# Patient Record
Sex: MALE | Race: WHITE | Employment: OTHER | URBAN - METROPOLITAN AREA
[De-identification: names, ages, dates, MRNs, and addresses within clinical notes are randomized per-mention and may not be internally consistent; named-entity substitution may affect disease eponyms.]

---

## 2017-04-16 ENCOUNTER — APPOINTMENT (OUTPATIENT)
Dept: CT IMAGING | Age: 77
End: 2017-04-16
Attending: EMERGENCY MEDICINE
Payer: MEDICARE

## 2017-04-16 ENCOUNTER — HOSPITAL ENCOUNTER (OUTPATIENT)
Age: 77
Setting detail: OBSERVATION
Discharge: HOME | End: 2017-04-17
Attending: EMERGENCY MEDICINE | Admitting: FAMILY MEDICINE
Payer: MEDICARE

## 2017-04-16 DIAGNOSIS — G45.9 TRANSIENT CEREBRAL ISCHEMIA, UNSPECIFIED TYPE: Primary | ICD-10-CM

## 2017-04-16 PROBLEM — E87.5 HYPERKALEMIA: Status: ACTIVE | Noted: 2017-04-16

## 2017-04-16 PROBLEM — R20.2 NUMBNESS AND TINGLING: Status: ACTIVE | Noted: 2017-04-16

## 2017-04-16 PROBLEM — R20.0 NUMBNESS AND TINGLING: Status: ACTIVE | Noted: 2017-04-16

## 2017-04-16 LAB
ALBUMIN SERPL BCP-MCNC: 3.9 G/DL (ref 3.5–5)
ALBUMIN/GLOB SERPL: 1.1 {RATIO} (ref 1.1–2.2)
ALP SERPL-CCNC: 60 U/L (ref 45–117)
ALT SERPL-CCNC: 27 U/L (ref 12–78)
ANION GAP BLD CALC-SCNC: 6 MMOL/L (ref 5–15)
AST SERPL W P-5'-P-CCNC: 35 U/L (ref 15–37)
BASOPHILS # BLD AUTO: 0 K/UL (ref 0–0.1)
BASOPHILS # BLD: 0 % (ref 0–1)
BILIRUB SERPL-MCNC: 0.4 MG/DL (ref 0.2–1)
BUN SERPL-MCNC: 17 MG/DL (ref 6–20)
BUN/CREAT SERPL: 21 (ref 12–20)
CALCIUM SERPL-MCNC: 8.9 MG/DL (ref 8.5–10.1)
CHLORIDE SERPL-SCNC: 103 MMOL/L (ref 97–108)
CO2 SERPL-SCNC: 26 MMOL/L (ref 21–32)
CREAT SERPL-MCNC: 0.8 MG/DL (ref 0.7–1.3)
EOSINOPHIL # BLD: 0 K/UL (ref 0–0.4)
EOSINOPHIL NFR BLD: 1 % (ref 0–7)
ERYTHROCYTE [DISTWIDTH] IN BLOOD BY AUTOMATED COUNT: 13.6 % (ref 11.5–14.5)
GLOBULIN SER CALC-MCNC: 3.6 G/DL (ref 2–4)
GLUCOSE BLD STRIP.AUTO-MCNC: 107 MG/DL (ref 65–100)
GLUCOSE SERPL-MCNC: 108 MG/DL (ref 65–100)
HCT VFR BLD AUTO: 39.5 % (ref 36.6–50.3)
HGB BLD-MCNC: 14 G/DL (ref 12.1–17)
INR BLD: 1 (ref 0.9–1.2)
LYMPHOCYTES # BLD AUTO: 38 % (ref 12–49)
LYMPHOCYTES # BLD: 1.8 K/UL (ref 0.8–3.5)
MCH RBC QN AUTO: 32.3 PG (ref 26–34)
MCHC RBC AUTO-ENTMCNC: 35.4 G/DL (ref 30–36.5)
MCV RBC AUTO: 91.2 FL (ref 80–99)
MONOCYTES # BLD: 0.4 K/UL (ref 0–1)
MONOCYTES NFR BLD AUTO: 9 % (ref 5–13)
NEUTS SEG # BLD: 2.5 K/UL (ref 1.8–8)
NEUTS SEG NFR BLD AUTO: 52 % (ref 32–75)
PLATELET # BLD AUTO: 86 K/UL (ref 150–400)
POTASSIUM SERPL-SCNC: 5.3 MMOL/L (ref 3.5–5.1)
PROT SERPL-MCNC: 7.5 G/DL (ref 6.4–8.2)
RBC # BLD AUTO: 4.33 M/UL (ref 4.1–5.7)
SERVICE CMNT-IMP: ABNORMAL
SODIUM SERPL-SCNC: 135 MMOL/L (ref 136–145)
TROPONIN I SERPL-MCNC: <0.04 NG/ML
WBC # BLD AUTO: 4.7 K/UL (ref 4.1–11.1)

## 2017-04-16 PROCEDURE — 85025 COMPLETE CBC W/AUTO DIFF WBC: CPT | Performed by: EMERGENCY MEDICINE

## 2017-04-16 PROCEDURE — 70450 CT HEAD/BRAIN W/O DYE: CPT

## 2017-04-16 PROCEDURE — 85610 PROTHROMBIN TIME: CPT

## 2017-04-16 PROCEDURE — 84484 ASSAY OF TROPONIN QUANT: CPT | Performed by: EMERGENCY MEDICINE

## 2017-04-16 PROCEDURE — 65660000000 HC RM CCU STEPDOWN

## 2017-04-16 PROCEDURE — 93005 ELECTROCARDIOGRAM TRACING: CPT

## 2017-04-16 PROCEDURE — 80053 COMPREHEN METABOLIC PANEL: CPT | Performed by: EMERGENCY MEDICINE

## 2017-04-16 PROCEDURE — 82962 GLUCOSE BLOOD TEST: CPT

## 2017-04-16 PROCEDURE — 99285 EMERGENCY DEPT VISIT HI MDM: CPT

## 2017-04-16 PROCEDURE — 36415 COLL VENOUS BLD VENIPUNCTURE: CPT | Performed by: EMERGENCY MEDICINE

## 2017-04-16 PROCEDURE — 65390000012 HC CONDITION CODE 44 OBSERVATION

## 2017-04-16 RX ORDER — SODIUM CHLORIDE 0.9 % (FLUSH) 0.9 %
5-10 SYRINGE (ML) INJECTION AS NEEDED
Status: DISCONTINUED | OUTPATIENT
Start: 2017-04-16 | End: 2017-04-17 | Stop reason: HOSPADM

## 2017-04-16 RX ORDER — OMEPRAZOLE 20 MG/1
20 CAPSULE, DELAYED RELEASE ORAL EVERY OTHER DAY
COMMUNITY

## 2017-04-16 RX ORDER — ATORVASTATIN CALCIUM 20 MG/1
20 TABLET, FILM COATED ORAL DAILY
COMMUNITY

## 2017-04-16 RX ORDER — SODIUM CHLORIDE 0.9 % (FLUSH) 0.9 %
5-10 SYRINGE (ML) INJECTION EVERY 8 HOURS
Status: DISCONTINUED | OUTPATIENT
Start: 2017-04-16 | End: 2017-04-17 | Stop reason: HOSPADM

## 2017-04-16 NOTE — Clinical Note
Status[de-identified] Inpatient [101] Type of Bed: Neuro/Stroke [9] Inpatient Hospitalization Certified Necessary for the Following Reasons: 4. Patient requires ICU level of care interventions (further clarification in H&P documentation) Admitting Diagnosis: TIA (transient ischemic attack) [246236] Admitting Diagnosis: Numbness and tingling [058585] Admitting Diagnosis: Hyperkalemia [079267] Admitting Physician: Mehdi Mazariegos Attending Physician: Mehdi Mazariegos Estimated Length of Stay: > or = to 2 Midnights Discharge Plan[de-identified] Home with Office Follow-up

## 2017-04-16 NOTE — ED PROVIDER NOTES
HPI Comments: 70-year-old male with diabetes presents with complaints of numbness and tingling in the right first and second digits and right-sided face at approximately 4:30 PM lasting one minute. All symptoms resolved. No other complaints. Denies any recent illness. Denies fever, chills, nausea, vomiting, headache, chest pain, shortness of breath, abdominal pain. Patient took 3 baby aspirin prior to arrival.    Denies tobacco use, drug use, alcohol use    The history is provided by the patient. Past Medical History:   Diagnosis Date    Diabetes ([address]     Murmur        Past Surgical History:   Procedure Laterality Date    HX APPENDECTOMY      HX HERNIA REPAIR      HX ORTHOPAEDIC      right shoulder    HX SEPTOPLASTY      HX TONSILLECTOMY           History reviewed. No pertinent family history. Social History     Social History    Marital status: N/A     Spouse name: N/A    Number of children: N/A    Years of education: N/A     Occupational History    Not on file. Social History Main Topics    Smoking status: Never Smoker    Smokeless tobacco: Not on file    Alcohol use No    Drug use: Not on file    Sexual activity: Not on file     Other Topics Concern    Not on file     Social History Narrative    No narrative on file         ALLERGIES: Review of patient's allergies indicates no known allergies. Review of Systems   Constitutional: Negative for chills and fever. HENT: Negative for congestion, nosebleeds and sore throat. Eyes: Negative for pain and discharge. Respiratory: Negative for cough and shortness of breath. Cardiovascular: Negative for chest pain and palpitations. Gastrointestinal: Negative for abdominal pain, constipation, nausea and vomiting. Genitourinary: Negative for decreased urine volume, dysuria, flank pain and urgency. Musculoskeletal: Negative for gait problem and myalgias. Skin: Negative for rash and wound.    Neurological: Positive for facial asymmetry and numbness. Negative for seizures and syncope. Hematological: Does not bruise/bleed easily. Psychiatric/Behavioral: Negative for confusion, self-injury and suicidal ideas. Vitals:    04/16/17 1839   BP: 124/86   Pulse: 84   Resp: 16   Temp: 97.9 °F (36.6 °C)   SpO2: 96%   Weight: 77.7 kg (171 lb 6.4 oz)   Height: 5' 8\" (1.727 m)            Physical Exam   Constitutional: He is oriented to person, place, and time. He appears well-developed and well-nourished. HENT:   Head: Normocephalic and atraumatic. Eyes: EOM are normal. Pupils are equal, round, and reactive to light. Neck: Normal range of motion. Neck supple. Cardiovascular: Normal rate, regular rhythm, normal heart sounds and intact distal pulses. Pulmonary/Chest: Effort normal and breath sounds normal. No respiratory distress. He has no wheezes. Abdominal: Soft. Bowel sounds are normal. There is no tenderness. There is no rebound and no guarding. Musculoskeletal: Normal range of motion. Neurological: He is alert and oriented to person, place, and time. NIHSS 1 for R lower facial droop   Skin: Skin is warm and dry. Psychiatric: He has a normal mood and affect. His behavior is normal.   Nursing note and vitals reviewed. MDM  Number of Diagnoses or Management Options  Diagnosis management comments:     72-year-old male presents with complaints of right-sided facial and hand numbness/tingling lasting approximately 1 minute. No current complaints. Patient is well-appearing, in no acute distress, hemodynamically stable, mild right-sided facial droop noted, and a stroke score 1. Plan-code stroke evaluation.        Amount and/or Complexity of Data Reviewed  Clinical lab tests: ordered and reviewed  Tests in the radiology section of CPT®: reviewed and ordered  Independent visualization of images, tracings, or specimens: yes    Risk of Complications, Morbidity, and/or Mortality  Presenting problems: high  Diagnostic procedures: high  Management options: high    Patient Progress  Patient progress: stable    ED Course       Procedures    ED EKG interpretation:  Rhythm: normal sinus rhythm; and regular . Rate (approx.): 80; Axis: normal; P wave: normal; QRS interval: normal ; ST/T wave: normal; no ischemia This EKG was interpreted by Kimberley Salinas MD,ED Provider. 7:18 PM  Kimberley Salinas MD spoke with Dr. Sanjeev Coates, Consult for Neurology. Discussed available diagnostic tests and clinical findings. He/She is in agreement with care plans as outlined. He/she recommends TIA admission. 7:50 PM  Kimberley Salinas MD spoke with Dr. Zelda Alvarez, Consult for Hospitalist. Discussed available diagnostic tests and clinical findings. He/She is in agreement with care plans as outlined. He/she will admit patient.

## 2017-04-16 NOTE — PROGRESS NOTES
Admission Medication Reconciliation:    Information obtained from: Patient, Rx Query    Significant PMH/Disease States:   Past Medical History:   Diagnosis Date    Diabetes [facility])     Murmur        Chief Complaint for this Admission:    Chief Complaint   Patient presents with    Numbness         Allergies:  Review of patient's allergies indicates no known allergies. Prior to Admission Medications:   Prior to Admission Medications   Prescriptions Last Dose Informant Patient Reported? Taking? L. acidoph & paracasei- S therm- Bifido (BERTRAND-Q/RISAQUAD) 8 billion cell cap cap 4/16/2017  Yes Yes   Sig: Take 1 Cap by mouth daily. VIT A/VIT C/VIT E/ZINC/COPPER (PRESERVISION AREDS PO) 4/16/2017  Yes Yes   Sig: Take 1 Tab by mouth two (2) times a day. atorvastatin (LIPITOR) 20 mg tablet 4/16/2017 at Unknown time  Yes Yes   Sig: Take 20 mg by mouth daily. omeprazole (PRILOSEC) 20 mg capsule 4/15/2017  Yes Yes   Sig: Take 20 mg by mouth every other day. Facility-Administered Medications: None         Comments/Recommendations: Spoke with patient. All medication entries above added today.         Alix Maldonado, PharmD

## 2017-04-16 NOTE — PROGRESS NOTES
Responded to Code Stroke in ED26. No family is present at this time. Unable to assess patient due to medical care being provided. Spiritual Care Services will follow-up as needed and able. Chaplain Kameron Wray M.Div.    Paging Service [phone])

## 2017-04-16 NOTE — IP AVS SNAPSHOT
[address] [facility] 
[phone] Patient: Michael Matute MRN: [id number] :1940 You are allergic to the following No active allergies Recent Documentation Height Weight BMI Smoking Status 1.727 m 77.7 kg 26.06 kg/m2 Never Smoker Emergency Contacts Name Discharge Info Relation Home Work Mobile Wendi Coleman  Spouse [3] [phone] About your hospitalization You were admitted on:  2017 You last received care in the:  [address] [place] [address] OBSERVATION You were discharged on:  2017 Unit phone number:  [phone] Why you were hospitalized Your primary diagnosis was:  Tia (Transient Ischemic Attack) Your diagnoses also included:  Hyperkalemia, Numbness And Tingling Providers Seen During Your Hospitalizations Provider Role Specialty Primary office phone Andrei Dunham MD Attending Provider Emergency Medicine [phone] Mega Sharif MD Attending Provider [facility] [phone] Yessi Retana MD Attending Provider Internal Medicine [phone] Your Primary Care Physician (PCP) Primary Care Physician Office Phone Office Fax Etelvina Bennett [phone] Follow-up Information Follow up With Details Comments Contact Info Ciara Krishna MD In 1 week  [facility] [address] [facility] [address] 
[phone] Citlalli Bautista MD In 2 weeks  Berenice Venegas [facility] [address] 
[phone] Current Discharge Medication List  
  
START taking these medications Dose & Instructions Dispensing Information Comments Morning Noon Evening Bedtime  
 aspirin delayed-release 81 mg tablet Your last dose was: Your next dose is:    
   
   
 Dose:  81 mg Take 1 Tab by mouth daily for 30 days. Quantity:  30 Tab Refills:  0 CONTINUE these medications which have NOT CHANGED Dose & Instructions Dispensing Information Comments Morning Noon Evening Bedtime  
 atorvastatin 20 mg tablet Commonly known as:  LIPITOR Your last dose was: Your next dose is:    
   
   
 Dose:  20 mg Take 20 mg by mouth daily. Refills:  0  
     
   
   
   
  
 L. acidoph & paracasei- S therm- Bifido 8 billion cell Cap cap Commonly known as:  BERTRAND-Q/RISAQUAD Your last dose was: Your next dose is:    
   
   
 Dose:  1 Cap Take 1 Cap by mouth daily. Refills:  0  
     
   
   
   
  
 omeprazole 20 mg capsule Commonly known as:  PRILOSEC Your last dose was: Your next dose is:    
   
   
 Dose:  20 mg Take 20 mg by mouth every other day. Refills:  0 PRESERVISION AREDS PO Your last dose was: Your next dose is:    
   
   
 Dose:  1 Tab Take 1 Tab by mouth two (2) times a day. Refills:  0 Where to Get Your Medications Information on where to get these meds will be given to you by the nurse or doctor. ! Ask your nurse or doctor about these medications  
  aspirin delayed-release 81 mg tablet Discharge Instructions Discharge Instructions PATIENT ID: Wendy Funez MRN: [id number] YOB: 1940 DATE OF ADMISSION: 4/16/2017  6:44 PM   
DATE OF DISCHARGE: 4/17/2017 PRIMARY CARE PROVIDER: Mark Clarke MD  
 
ATTENDING PHYSICIAN: Vandana Ramirez MD 
DISCHARGING PROVIDER: Vandana Ramirez MD   
To contact this individual call [phone] and ask the  to page. If unavailable ask to be transferred the Adult Hospitalist Department. DISCHARGE DIAGNOSES TIA 
 
CONSULTATIONS: IP CONSULT TO HOSPITALIST 
IP CONSULT TO NEUROLOGY PROCEDURES/SURGERIES: * No surgery found * PENDING TEST RESULTS:  
 At the time of discharge the following test results are still pending: FOLLOW UP APPOINTMENTS:  
Follow-up Information Follow up With Details Comments Contact Info Stephen Marquez MD In 1 week  [address] [place] [address] 
[phone] ADDITIONAL CARE RECOMMENDATIONS:  
 
DIET: Cardiac Diet ACTIVITY: Activity as tolerated WOUND CARE:  
 
EQUIPMENT needed:  
 
 
  
 SNF/Inpatient Rehab/LTAC Independent/assisted living Hospice Other: CDMP Checked:  
Yes x PROBLEM LIST Updated: 
Yes x Signed:  
Vernon Loyola MD 
4/17/2017 11:59 AM 
 
Discharge Orders None [facility] Announcement We are excited to announce that we are making your provider's discharge notes available to you in [employer]. You will see these notes when they are completed and signed by the physician that discharged you from your recent hospital stay. If you have any questions or concerns about any information you see in [employer], please call the Health Information Department where you were seen or reach out to your Primary Care Provider for more information about your plan of care. Introducing [facility]! Radha Villa introduces [employer] patient portal. Now you can access parts of your medical record, email your doctor's office, and request medication refills online. 1. In your internet browser, go to [email or web address] 2. Click on the First Time User? Click Here link in the Sign In box. You will see the New Member Sign Up page. 3. Enter your [employer] Access Code exactly as it appears below. You will not need to use this code after youve completed the sign-up process. If you do not sign up before the expiration date, you must request a new code. · [employer] Access Code: [email or web address] Expires: 7/15/2017  7:03 PM 
 
4. Enter the last four digits of your Social Security Number (xxxx) and Date of Birth (mm/dd/yyyy) as indicated and click Submit. You will be taken to the next sign-up page. 5. Create a [employer] ID. This will be your [employer] login ID and cannot be changed, so think of one that is secure and easy to remember. 6. Create a [employer] password. You can change your password at any time. 7. Enter your Password Reset Question and Answer. This can be used at a later time if you forget your password. 8. Enter your e-mail address. You will receive e-mail notification when new information is available in [address] 9. Click Sign Up. You can now view and download portions of your medical record. 10. Click the Download Summary menu link to download a portable copy of your medical information. If you have questions, please visit the Frequently Asked Questions section of the [employer] website. Remember, [employer] is NOT to be used for urgent needs. For medical emergencies, dial 911. Now available from your iPhone and Android! General Information Please provide this summary of care documentation to your next provider. Patient Signature:  ____________________________________________________________ Date:  ____________________________________________________________  
  
Lashell Cardona Provider Signature:  ____________________________________________________________ Date:  ____________________________________________________________

## 2017-04-16 NOTE — ED TRIAGE NOTES
TRIAGE NOTE:\"I was outside this afternoon(1630) and noticed and odd tingling on right hand in 1st and 2nd fingers and at the same time tingling in lips (right side). It lasted less than 1 minute. \" Denies any other symptoms.

## 2017-04-17 ENCOUNTER — APPOINTMENT (OUTPATIENT)
Dept: MRI IMAGING | Age: 77
End: 2017-04-17
Attending: FAMILY MEDICINE
Payer: MEDICARE

## 2017-04-17 VITALS
BODY MASS INDEX: 25.98 KG/M2 | HEIGHT: 68 IN | DIASTOLIC BLOOD PRESSURE: 81 MMHG | OXYGEN SATURATION: 96 % | HEART RATE: 66 BPM | WEIGHT: 171.4 LBS | SYSTOLIC BLOOD PRESSURE: 143 MMHG | TEMPERATURE: 98.4 F | RESPIRATION RATE: 16 BRPM

## 2017-04-17 PROBLEM — G45.9 TIA (TRANSIENT ISCHEMIC ATTACK): Status: RESOLVED | Noted: 2017-04-16 | Resolved: 2017-04-17

## 2017-04-17 PROBLEM — E87.5 HYPERKALEMIA: Status: RESOLVED | Noted: 2017-04-16 | Resolved: 2017-04-17

## 2017-04-17 PROBLEM — R20.0 NUMBNESS AND TINGLING: Status: RESOLVED | Noted: 2017-04-16 | Resolved: 2017-04-17

## 2017-04-17 PROBLEM — R20.2 NUMBNESS AND TINGLING: Status: RESOLVED | Noted: 2017-04-16 | Resolved: 2017-04-17

## 2017-04-17 LAB
ANION GAP BLD CALC-SCNC: 9 MMOL/L (ref 5–15)
ATRIAL RATE: 80 BPM
BUN SERPL-MCNC: 12 MG/DL (ref 6–20)
BUN/CREAT SERPL: 18 (ref 12–20)
CALCIUM SERPL-MCNC: 8.9 MG/DL (ref 8.5–10.1)
CALCULATED P AXIS, ECG09: 48 DEGREES
CALCULATED R AXIS, ECG10: -3 DEGREES
CALCULATED T AXIS, ECG11: 34 DEGREES
CHLORIDE SERPL-SCNC: 103 MMOL/L (ref 97–108)
CHOLEST SERPL-MCNC: 128 MG/DL
CO2 SERPL-SCNC: 24 MMOL/L (ref 21–32)
CREAT SERPL-MCNC: 0.68 MG/DL (ref 0.7–1.3)
DIAGNOSIS, 93000: NORMAL
EST. AVERAGE GLUCOSE BLD GHB EST-MCNC: 148 MG/DL
GLUCOSE BLD STRIP.AUTO-MCNC: 97 MG/DL (ref 65–100)
GLUCOSE SERPL-MCNC: 104 MG/DL (ref 65–100)
HBA1C MFR BLD: 6.8 % (ref 4.2–6.3)
HDLC SERPL-MCNC: 57 MG/DL
HDLC SERPL: 2.2 {RATIO} (ref 0–5)
LDLC SERPL CALC-MCNC: 56.2 MG/DL (ref 0–100)
LIPID PROFILE,FLP: NORMAL
P-R INTERVAL, ECG05: 208 MS
POTASSIUM SERPL-SCNC: 3.9 MMOL/L (ref 3.5–5.1)
Q-T INTERVAL, ECG07: 384 MS
QRS DURATION, ECG06: 96 MS
QTC CALCULATION (BEZET), ECG08: 442 MS
SERVICE CMNT-IMP: NORMAL
SODIUM SERPL-SCNC: 136 MMOL/L (ref 136–145)
TRIGL SERPL-MCNC: 74 MG/DL (ref ?–150)
VENTRICULAR RATE, ECG03: 80 BPM
VLDLC SERPL CALC-MCNC: 14.8 MG/DL

## 2017-04-17 PROCEDURE — 99218 HC RM OBSERVATION: CPT

## 2017-04-17 PROCEDURE — 97165 OT EVAL LOW COMPLEX 30 MIN: CPT | Performed by: OCCUPATIONAL THERAPIST

## 2017-04-17 PROCEDURE — G8988 SELF CARE GOAL STATUS: HCPCS | Performed by: OCCUPATIONAL THERAPIST

## 2017-04-17 PROCEDURE — 97112 NEUROMUSCULAR REEDUCATION: CPT | Performed by: OCCUPATIONAL THERAPIST

## 2017-04-17 PROCEDURE — 82962 GLUCOSE BLOOD TEST: CPT

## 2017-04-17 PROCEDURE — G8987 SELF CARE CURRENT STATUS: HCPCS | Performed by: OCCUPATIONAL THERAPIST

## 2017-04-17 PROCEDURE — 93880 EXTRACRANIAL BILAT STUDY: CPT

## 2017-04-17 PROCEDURE — 96361 HYDRATE IV INFUSION ADD-ON: CPT

## 2017-04-17 PROCEDURE — 80048 BASIC METABOLIC PNL TOTAL CA: CPT | Performed by: FAMILY MEDICINE

## 2017-04-17 PROCEDURE — 70551 MRI BRAIN STEM W/O DYE: CPT

## 2017-04-17 PROCEDURE — 74011250636 HC RX REV CODE- 250/636: Performed by: FAMILY MEDICINE

## 2017-04-17 PROCEDURE — 83036 HEMOGLOBIN GLYCOSYLATED A1C: CPT | Performed by: FAMILY MEDICINE

## 2017-04-17 PROCEDURE — 96360 HYDRATION IV INFUSION INIT: CPT

## 2017-04-17 PROCEDURE — G8989 SELF CARE D/C STATUS: HCPCS | Performed by: OCCUPATIONAL THERAPIST

## 2017-04-17 PROCEDURE — 36415 COLL VENOUS BLD VENIPUNCTURE: CPT | Performed by: FAMILY MEDICINE

## 2017-04-17 PROCEDURE — 93306 TTE W/DOPPLER COMPLETE: CPT

## 2017-04-17 PROCEDURE — 80061 LIPID PANEL: CPT | Performed by: FAMILY MEDICINE

## 2017-04-17 RX ORDER — SODIUM CHLORIDE 9 MG/ML
75 INJECTION, SOLUTION INTRAVENOUS CONTINUOUS
Status: DISCONTINUED | OUTPATIENT
Start: 2017-04-17 | End: 2017-04-17 | Stop reason: HOSPADM

## 2017-04-17 RX ORDER — MAGNESIUM SULFATE 100 %
4 CRYSTALS MISCELLANEOUS AS NEEDED
Status: DISCONTINUED | OUTPATIENT
Start: 2017-04-17 | End: 2017-04-17 | Stop reason: HOSPADM

## 2017-04-17 RX ORDER — ASPIRIN 81 MG/1
81 TABLET ORAL DAILY
Qty: 30 TAB | Refills: 0 | Status: SHIPPED | OUTPATIENT
Start: 2017-04-17 | End: 2017-05-17

## 2017-04-17 RX ORDER — ASPIRIN 81 MG/1
81 TABLET ORAL DAILY
Status: DISCONTINUED | OUTPATIENT
Start: 2017-04-17 | End: 2017-04-17 | Stop reason: HOSPADM

## 2017-04-17 RX ORDER — DEXTROSE 50 % IN WATER (D50W) INTRAVENOUS SYRINGE
12.5-25 AS NEEDED
Status: DISCONTINUED | OUTPATIENT
Start: 2017-04-17 | End: 2017-04-17 | Stop reason: HOSPADM

## 2017-04-17 RX ORDER — INSULIN LISPRO 100 [IU]/ML
INJECTION, SOLUTION INTRAVENOUS; SUBCUTANEOUS
Status: DISCONTINUED | OUTPATIENT
Start: 2017-04-17 | End: 2017-04-17 | Stop reason: HOSPADM

## 2017-04-17 RX ADMIN — SODIUM CHLORIDE 1000 ML: 900 INJECTION, SOLUTION INTRAVENOUS at 05:32

## 2017-04-17 NOTE — PROGRESS NOTES
TRANSFER - IN REPORT:    Verbal report received from [facility] (name) on Emmanuelle Weaver  being received from ED (unit) for routine progression of care      Report consisted of patients Situation, Background, Assessment and   Recommendations(SBAR). Information from the following report(s) SBAR, Kardex, [facility] and Recent Results was reviewed with the receiving nurse. Opportunity for questions and clarification was provided. Assessment completed upon patients arrival to unit and care assumed.

## 2017-04-17 NOTE — PROCEDURES
[facility] [place]  *** FINAL REPORT ***    Name: Travis Sheridan  MRN: [id number]    Inpatient  : 17 Oct 1940  HIS Order #: [id number]  [address] [facility] Visit #: [id number]  Date: 2017    TYPE OF TEST: Cerebrovascular Duplex    REASON FOR TEST  TIA. Right side numbnes. Right Carotid:-             Proximal               Mid                 Distal  cm/s  Systolic  Diastolic  Systolic  Diastolic  Systolic  Diastolic  CCA:     21.8                                      77.0  Bulb:  ECA:     71.0  ICA:     41.0      13.0                            79.0      34.0  ICA/CCA:  0.5    ICA Stenosis:    Right Vertebral:-  Finding: Antegrade  Sys:       37.0  Hallie:       14.0    Right Subclavian:    Left Carotid:-            Proximal                Mid                 Distal  cm/s  Systolic  Diastolic  Systolic  Diastolic  Systolic  Diastolic  CCA:     91.7                                      62.0  Bulb:  ECA:     86.0  ICA:     71.0      22.0                            61.0      25.0  ICA/CCA:  1.1    ICA Stenosis:    Left Vertebral:-  Finding: Antegrade  Sys:       25.0  Hallie:        7.0    Left Subclavian:    INTERPRETATION/FINDINGS  PROCEDURE:  Color duplex ultrasound imaging of extracranial  cerebrovascular arteries. FINDINGS:       Right:  Internal carotid velocity is within normal limits. There   is narrowing of the flow channel on color Doppler imaging and mixed  density plaque on B-mode imaging, consistent with less than 50 percent   stenosis (lower portion of the 0 to 49 percent range). The common  and external carotid arteries are patent and without evidence of  hemodynamically significant stenosis. Left:   Internal carotid velocity is within normal limits. There   is narrowing of the flow channel on color Doppler imaging and mixed  density plaque on B-mode imaging, consistent with less than 50 percent   stenosis (lower portion of the 0 to 49 percent range).   The common  and external carotid arteries are patent and without evidence of  hemodynamically significant stenosis. IMPRESSION:  Consistent with less than 50 percent stenosis of the  right and left internal carotid artery (lower portion of the 0 to 49  percent range). Vertebrals are patent with antegrade flow. ADDITIONAL COMMENTS    I have personally reviewed the data relevant to the interpretation of  this  study.     TECHNOLOGIST: Mireille Mcmanus RVT  Signed: 04/17/2017 10:13 AM    PHYSICIAN: Melisa Delgado MD  Signed: 04/18/2017 06:28 AM

## 2017-04-17 NOTE — PROGRESS NOTES
Went over discharge instructions with pt. IV discontinued. Pt given 1 Rx. Pt ambulatory at time of discharge with male friend. No signs of distress.

## 2017-04-17 NOTE — H&P
[address] [facility] [address]   HISTORY AND PHYSICAL       Name:  Chaya Mandel   MR#:  [id number]   :  1940   Account #:  [de-identified]        Date of Adm:  2017       CHIEF COMPLAINT: Numbness. HISTORY OF PRESENT ILLNESS: A 70-year-old white male with past   medical history of type 2 diabetes mellitus and heart murmur who   presented to the emergency department from home with the chief   complaint of numbness. The patient noted onset of symptoms with   numbness involving the right side of his face and the right first and   second fingers of the right hand at approximately 4:30 p.m. on   2017. The patient notes the symptoms lasted for approximately   \"30 seconds to a minute\" and then symptoms resolved. He is not   reporting dizziness, lightheadedness, focal weakness, numbness,   paresthesias (at current). There are no reports of headache, neck pain,   visual disturbance, diplopia, chest pain, back pain, abdominal pain,   nausea, vomiting, diarrhea, melena, dysuria, hematuria, calf pain,   swelling, edema. He reports no prior history of stroke, seizures, or   other neurological disorders. On arrival in the emergency department,   the patient underwent a workup including CT code neuro of the head   without contrast showed no acute intracranial process. The patient had   elevated potassium of 5.3. The patient is now seen for admission to   the hospitalist service for continued evaluation and treatment. Per the   ER report, the patient had reportedly taken 381 mg aspirin tablets prior   to arrival.    PAST MEDICAL HISTORY:   1. Type 2 diabetes mellitus. 2. Heart murmur. 3. Hyperlipidemia. PAST SURGICAL HISTORY:   1. Appendectomy. 2. Hernia repair, unspecified (right shoulder surgery). 3. Septoplasty. 4. Tonsillectomy. 5. Atorvastatin 20 mg p.o. daily. 6. Essence-Q 1 capsule p.o. every day. 7. Omeprazole 20 mg p.o. every other day.    8. PreserVision Areds 1 tablet p.o. b.i.d. ALLERGIES: NO KNOWN DRUG ALLERGIES. SOCIAL HISTORY: He denies smoking, alcohol, illicit drugs. FAMILY HISTORY: No reported family members with heart attacks or   strokes. REVIEW OF SYSTEMS   An 11 systems reviewed, pertinent positives as HPI, otherwise   negative. PHYSICAL EXAMINATION   VITAL SIGNS: Temperature is 97.1 degrees Fahrenheit, blood   pressure 132/77, heart rate 75, respiratory rate 22, O2 saturation 96%   on room air. Recorded weight of 171 pounds (77.7 kg). Recorded   height of 5 feet 8 inches tall. GENERAL: The patient in no acute respiratory distress. PSYCHIATRIC: The patient is awake, alert, oriented x3. NEUROLOGIC: GCS of 15. Moves extremities x4. Sensation is grossly   intact without slurred speech, facial droop. HEENT: Normocephalic, atraumatic. PERRLA is intact. Sclerae are   anicteric. Conjunctivae are clear. Nares are patent. Pharynx clear. Tongue is midline, not edematous. NECK: Supple, without lymphadenopathy, JVD, carotid bruits. No   thyromegaly. LYMPH: Negative for cervical or supraclavicular adenopathy. RESPIRATORY: Lungs clear to auscultation bilaterally. CARDIOVASCULAR: Heart regular rate and rhythm, normal S1, S2,   without murmurs, rubs or gallops. GI: Abdomen soft, nontender, nondistended, normoactive bowel   sounds. No rebound, guarding or rigidity. No auscultated abdominal   bruits. No pulsatile mass. BACK: No CVA tenderness. No step-off deformity. MUSCULOSKELETAL: No acute palpable bony deformity. Negative for   calf tenderness. VASCULAR: 2+ radial, DP pulses without cyanosis, clubbing, edema. SKIN: Warm and dry. LABORATORY DATA AS FOLLOWS: Sodium is 135, potassium 5.3,   chloride 103, CO2 26, BUN of 17, creatinine 0.80, glucose 108, anion   gap of 8.9, GFR greater than 60, total bilirubin 0.4, total protein 7.5,   albumin is 3.9, ALT of 27, AST of 35, alkaline phosphate 60.  Troponin I   less than 0.04. WBC 4.7, hemoglobin 14.0, hematocrit 39.5, platelet of   86, neutrophils of 52%. INR 1.0. CT columnar of the head without contrast, no evidence of acute   intracranial process. A 12-lead EKG normal sinus rhythm, 82 beats per   minute. IMPRESSION AND PLAN: A 53-year-old male with noted past medical   history, now admitted with transient ischemic attack, right-sided   numbness. 1. Transient ischemic attack. The patient placed on observation status. Symptoms have resolved with no focal neurological deficits. I spoke   with the patient who initially was undecided if he would like to stay   secondary to observation status. However, the patient is now in   agreement to staying at the hospital, continue with our medical care,   evaluation and treatments. We then placed order for MRI of the brain,   carotid Doppler, 2D echocardiogram. Consult with neurologist.   2. Right-sided numbness. Plan as noted above. 3. Hyperkalemia. Orders done 0.9% normal saline, 1000 mL IV fluid   bolus followed by fluid maintenance. Repeat potassium levels. Continue telemetry monitoring. 4. Heart murmur. Check 2D echocardiogram.   5. Hyperlipidemia. Check lipid panel and continue on atorvastatin. 6. Hypoglycemia, mild. 7. Venous thromboembolism prophylaxis. Sequential compression   devices, bilateral lower extremities. ROM Monge MD      MP / CD   D:  04/17/2017   04:09   T:  04/17/2017   05:51   Job #:  [id number]

## 2017-04-17 NOTE — PROGRESS NOTES
Problem: Patient Education: Go to Patient Education Activity  Goal: Patient/Family Education  OCCUPATIONAL THERAPY NEUROLOGICAL EVALUATION WITH DISCHARGE  Patient: Tersesa Orourke [de-identified]78 y.o. male)  Date: 4/17/2017  Primary Diagnosis: TIA (transient ischemic attack)  Numbness and tingling  Hyperkalemia  TIA/ RIGHT SIDED NUMBNESS        Precautions:          ASSESSMENT:  Based on the objective data described below, the patient presents with return to baseline. Independent all ADL's and mobility, no fall hx, no deficits at this time. 66/66 on Fugl-Mercedes and 100/100 on Barthel Index. Educated on on CVA prevention, signs/symptoms. Educated on BE-FAST and need to respond to symptoms quickly. .  Further skilled acute occupational therapy is not indicated at this time. Discharge Recommendations: None  Further Equipment Recommendations for Discharge: none       SUBJECTIVE:   Patient stated I'm ready to go.       OBJECTIVE DATA SUMMARY:   HISTORY:   Past Medical History:   Diagnosis Date    Diabetes ([address]      Murmur       Past Surgical History:   Procedure Laterality Date    HX APPENDECTOMY        HX HERNIA REPAIR        HX ORTHOPAEDIC         right shoulder    HX SEPTOPLASTY        HX TONSILLECTOMY            Prior Level of Function/Home Situation: independent, no falls  Expanded or extensive additional review of patient history:      Home Situation  Patient Expects to be Discharged to[de-identified] Private residence  Current DME Used/Available at Home: None  [ ]  Right hand dominant   [ ]  Left hand dominant     EXAMINATION OF PERFORMANCE DEFICITS:  Cognitive/Behavioral Status:  Neurologic State: Alert  Orientation Level: Oriented X4  Cognition: Appropriate decision making; Appropriate for age attention/concentration; Appropriate safety awareness; Follows commands  Perception: Appears intact  Perseveration: No perseveration noted  Safety/Judgement: Awareness of environment; Fall prevention;Home safety     Skin: intact     Edema: none     Hearing: Auditory  Auditory Impairment: None     Vision/Perceptual:                                Corrective Lenses: Glasses     Range of Motion:  AROM: Within functional limits  PROM: Within functional limits                       Strength:  Strength: Within functional limits                 Coordination:  Coordination: Within functional limits  Fine Motor Skills-Upper: Left Intact; Right Intact    Gross Motor Skills-Upper: Left Intact; Right Intact     Tone & Sensation:  Tone: Normal  Sensation: Intact                       Balance:  Sitting: Intact; Without support  Standing: Intact; Without support     Functional Mobility and Transfers for ADLs:  Bed Mobility:  Rolling: Independent  Supine to Sit: Independent  Sit to Supine: Independent  Scooting: Independent     Transfers:  Sit to Stand: Independent  Functional Transfers  Toilet Transfer : Independent     ADL Assessment:  Feeding: Independent     Oral Facial Hygiene/Grooming: Independent     Bathing: Independent     Upper Body Dressing: Independent     Lower Body Dressing: Independent     Toileting: Independent                 ADL Intervention and task modifications:     Educated on BE-FAST, fall prevention, home safety                                      Cognitive Retraining  Safety/Judgement: Awareness of environment; Fall prevention;Home safety     Functional Measure:     Barthel Index:      Bathin  Bladder: 10  Bowels: 10  Groomin  Dressing: 10  Feeding: 10  Mobility: 15  Stairs: 10  Toilet Use: 10  Transfer (Bed to Chair and Back): 15  Total: 100         Barthel and G-code impairment scale:  Percentage of impairment CH  0% CI  1-19% CJ  20-39% CK  40-59% CL  60-79% CM  80-99% CN  100%   Barthel Score 0-100 100 99-80 79-60 59-40 20-39 1-19    0   Barthel Score 0-20 20 17-19 13-16 9-12 5-8 1-4 0      The Barthel ADL Index: Guidelines  1.  The index should be used as a record of what a patient does, not as a record of what a patient could do.  2. The main aim is to establish degree of independence from any help, physical or verbal, however minor and for whatever reason. 3. The need for supervision renders the patient not independent. 4. A patient's performance should be established using the best available evidence. Asking the patient, friends/relatives and nurses are the usual sources, but direct observation and common sense are also important. However direct testing is not needed. 5. Usually the patient's performance over the preceding 24-48 hours is important, but occasionally longer periods will be relevant. 6. Middle categories imply that the patient supplies over 50 per cent of the effort. 7. Use of aids to be independent is allowed. Gemma Banerjee., Barthel, D.W. ([id number]). Functional evaluation: the Barthel Index. [facility] (14)2. Celia Arce dimas JONH Bynum, Lizzy Snyder., Garima Mahan., [place], [address] [place] [address] (1999). Measuring the change indisability after inpatient rehabilitation; comparison of the responsiveness of the Barthel Index and Functional [place] Measure. Journal of Neurology, Neurosurgery, and Psychiatry, 66(4), 771-671. Cody Orourke, N.J.A, BRANDI Billingsley, & Brittany Levi, M.A. (2004.) Assessment of post-stroke quality of life in cost-effectiveness studies: The usefulness of the Barthel Index and the EuroQoL-5D.  Quality of Life Research, 13, 427-43         Fugl-Mercedes Assessment of Motor Recovery after Stroke:       Reflex Activity  Flexors/Biceps/Fingers: Can be elicited  Extensors/Triceps: Can be elicited  Reflex Subtotal: 4     Volitional Movement Within Synergies  Shoulder Retraction: Full  Shoulder Elevation: Full  Shoulder Abduction (90 degrees): Full  Shoulder External Rotation: Full  Elbow Flexion: Full  Forearm Supination: Full  Shoulder Adduction/Internal Rotation: Full  Elbow Extension: Full  Forearm Pronation: Full  Subtotal: 18     Volitional Movement Mixing Synergies  Hand to Lumbar Spine: Full  Shoulder Flexion (0-90 degrees): Full  Pronation-Supination: Full  Subtotal: 6     Volitional Movement With Little or No Synergy  Shoulder Abduction (0-90 degrees): Full  Shoulder Flexion ( degrees): Full  Pronation/Supination: Full  Subtotal : 6     Normal Reflex Activity  Biceps, Triceps, Finger Flexors: Full  Subtotal : 2     Upper Extremity Total   Upper Extremity Total: 36     Wrist  Stability at 15 Degree Dorsiflexion: Full  Repeated Dorsiflexion/ Volar Flexion: Full  Stability at 15 Degree Dorsiflexion: Full  Repeated Dorsiflexion/ Volar Flexion: Full  Circumduction: Full  Wrist Total: 10     Hand  Mass Flexion: Full  Mass Extension: Full  Grasp A: Full  Grasp B: Full  Grasp C: Full  Grasp D: Full  Grasp E: Full  Hand Total: 14     Coordination/Speed  Tremor: None  Dysmetria: None  Time: <1s  Coordination/Speed Total : 6     Total A-D  Total A-D (Motor Function): 66/66      Percentage of impairment CH  0% CI  1-19% CJ  20-39% CK  40-59% CL  60-79% CM  80-99% CN  100%   Fugl-Mercedes score: 0-66 66 53-65 39-52 26-38 13-25 1-12    0      This is a reliable/valid measure of arm function after a neurological event. It has established value to characterize functional status and for measuring spontaneous and therapy-induced recovery; tests proximal and distal motor functions. Fugl-Mercedes Assessment - UE scores recorded between five and 30 days post neurologic event can be used to predict UE recovery at six months post neurologic event. Severe = 0-21 points   Moderately Severe = 22-33 points   Moderate = 34-47 points   Mild = 48-66 points  DEV Andujar, ERIN Sorensen, & EARLENE García (1992). Measurement of motor recovery after stroke: Outcome assessment and sample size requirements.  Stroke, 23, pp. 2520-8446.   ------------------------------------------------------------------------------------------------------------------------------------------------------------------  MCID:  Stroke:   Allegra Knox al, 2001; n = 171; mean age 79 (5) years; assessed within 16 (15) days of stroke, Acute Stroke)  FMA Motor Scores from Admission to Discharge   10 point increase in FMA Upper Extremity = 1.5 change in discharge FIM   10 point increase in FMA Lower Extremity = 1.9 change in discharge FIM  MDC:   Stroke:   Jackie Hamilton et al, 2008, n = 14, mean age = 59.9 (14.6) years, assessed on average 14 (6.5) months post stroke, Chronic Stroke)   FMA = 5.2 points for the Upper Extremity portion of the assessment      G codes: In compliance with CMSs Claims Based Outcome Reporting, the following G-code set was chosen for this patient based on their primary functional limitation being treated: The outcome measure chosen to determine the severity of the functional limitation was the Fugl-arias with a score of 66/66 which was correlated with the impairment scale. · Self Care:               - CURRENT STATUS:    CH - 0% impaired, limited or restricted               - GOAL STATUS:           CH - 0% impaired, limited or restricted               - D/C STATUS:                       CH - 0% impaired, limited or restricted     Occupational Therapy Evaluation Charge Determination   History Examination Decision-Making   LOW Complexity : Brief history review  LOW Complexity : 1-3 performance deficits relating to physical, cognitive , or psychosocial skils that result in activity limitations and / or participation restrictions  LOW Complexity : No comorbidities that affect functional and no verbal or physical assistance needed to complete eval tasks       Based on the above components, the patient evaluation is determined to be of the following complexity level: LOW      Pain:  Pain Scale 1: Numeric (0 - 10)  Pain Intensity 1: 0      no complaint        Activity Tolerance:   Good   Please refer to the flowsheet for vital signs taken during this treatment.   After treatment:   [X]  Patient left in no apparent distress sitting up in chair  [ ]  Patient left in no apparent distress in bed  [X]  Call bell left within reach  [X]  Nursing notified  [X]  Caregiver present  [ ]  Bed alarm activated      COMMUNICATION/EDUCATION:   Findings and recommendations were discussed with: Registered Nurse     Patient was educated regarding His deficit(s) of prior tingling right hand digits 1 and 2 and lips as this relates to His diagnosis of TIA. He demonstrated Excellent understanding as evidenced by verbalizinga. Patient and/or family was verbally educated on the BE FAST acronym for signs/symptoms of CVA and TIA. BE FAST was written on patient's communication board  for visual education and reinforcement. All questions answered with patient indicating excellent understanding.      [X]      Home safety education was provided and the patient/caregiver indicated understanding. [X]      Patient/family have participated as able and agree with findings and recommendations. [ ]      Patient is unable to participate in plan of care at this time.         Thank you for this referral.  Kay Manning, OTR/L  Time Calculation: 15 mins

## 2017-04-17 NOTE — ED NOTES
Pt alert and awake. Respirations even and unlabored. VSS. Pt updated on plan of care, declined comfort needs at this time.

## 2017-04-17 NOTE — PROGRESS NOTES
CM met with pt at bedside and reviewed role of CM in transitions of care planning. Pt verbalized understanding and CM updated PCP in EMR to Dr. Marc Engel. Pt is normally independent and pending results, expects to go home at discharge. Pt received Outpatient Observation bed notice and opportunities for questions given, pt verbalized understanding. JENNIFER Byers    Care Management Interventions  PCP Verified by CM:  Yes (updated to Dr. Marc Engel)  Physical Therapy Consult: Yes  Occupational Therapy Consult: Yes  Speech Therapy Consult: Yes  Current Support Network: Lives with Spouse  Confirm Follow Up Transport: Family  Plan discussed with Pt/Family/Caregiver: Yes  Freedom of Choice Offered: Yes  Discharge Location  Discharge Placement: Home

## 2017-04-17 NOTE — DISCHARGE SUMMARY
Discharge Summary       PATIENT ID: Dmitri Solano  MRN: [id number]   YOB: 1940    DATE OF ADMISSION: 4/16/2017  6:44 PM    DATE OF DISCHARGE: 4/17/17   PRIMARY CARE PROVIDER: Diane Sow MD     ATTENDING PHYSICIAN: Sharlene Luna  DISCHARGING PROVIDER: Nag Mendez MD    To contact this individual call [phone] and ask the  to page. If unavailable ask to be transferred the Adult Hospitalist Department. CONSULTATIONS: IP CONSULT TO HOSPITALIST  IP CONSULT TO NEUROLOGY    PROCEDURES/SURGERIES: * No surgery found *    ADMITTING DIAGNOSES & HOSPITAL COURSE:   A 72-year-old white male with past   medical history of type 2 diabetes mellitus and heart murmur who   presented to the emergency department from home with the chief   complaint of numbness. The patient noted onset of symptoms with   numbness involving the right side of his face and the right first and   second fingers of the right hand at approximately 4:30 p.m. on   04/16/2017    W/u negative , risk factors for Probable TIA,  MRI has  no acute findings. Has risk factors of DM and hypercholesterolemia, both well controlled. Recommend starting ASA 81mg EC by neurology         PENDING TEST RESULTS:   At the time of discharge the following test results are still pending:     FOLLOW UP APPOINTMENTS:    Follow-up Information     Follow up With Details Comments Lurdes Burch MD In 1 week  [facility] [phone]             ADDITIONAL CARE RECOMMENDATIONS:     DIET: Cardiac Diet    ACTIVITY: Activity as tolerated    WOUND CARE:     EQUIPMENT needed:       DISCHARGE MEDICATIONS:  Current Discharge Medication List      START taking these medications    Details   aspirin delayed-release 81 mg tablet Take 1 Tab by mouth daily for 30 days.   Qty: 30 Tab, Refills: 0         CONTINUE these medications which have NOT CHANGED    Details   omeprazole (PRILOSEC) 20 mg capsule Take 20 mg by mouth every other day. VIT A/VIT C/VIT E/ZINC/COPPER (PRESERVISION AREDS PO) Take 1 Tab by mouth two (2) times a day. atorvastatin (LIPITOR) 20 mg tablet Take 20 mg by mouth daily. L. acidoph & paracasei- S therm- Bifido (BERTRAND-Q/RISAQUAD) 8 billion cell cap cap Take 1 Cap by mouth daily. NOTIFY YOUR PHYSICIAN FOR ANY OF THE FOLLOWING:   Fever over 101 degrees for 24 hours. Chest pain, shortness of breath, fever, chills, nausea, vomiting, diarrhea, change in mentation, falling, weakness, bleeding. Severe pain or pain not relieved by medications. Or, any other signs or symptoms that you may have questions about.     DISPOSITION:  x  Home With:   OT  PT  HH  RN       Long term SNF/Inpatient Rehab    Independent/assisted living    Hospice    Other:       PATIENT CONDITION AT DISCHARGE:     Functional status    Poor     Deconditioned    x Independent      Cognition    x Lucid     Forgetful     Dementia      Catheters/lines (plus indication)    Amaya     PICC     PEG    x None      Code status   x  Full code     DNR            CHRONIC MEDICAL DIAGNOSES:  Problem List as of 4/17/2017  Date Reviewed: 4/17/2017          Codes Class Noted - Resolved    * (Principal)RESOLVED: TIA (transient ischemic attack) ICD-10-CM: G45.9  ICD-9-CM: 435.9  4/16/2017 - 4/17/2017        RESOLVED: Hyperkalemia ICD-10-CM: E87.5  ICD-9-CM: 276.7  4/16/2017 - 4/17/2017        RESOLVED: Numbness and tingling ICD-10-CM: R20.0, R20.2  ICD-9-CM: 782.0  4/16/2017 - 4/17/2017              Greater than 20  minutes were spent with the patient on counseling and coordination of care    Signed:   Enrique Cortez MD  4/17/2017  12:00 PM

## 2017-04-17 NOTE — PROGRESS NOTES
Reviewed chart and note patient with resolution of symptoms and MRI negative for acute infarct.  nsg dysphagia screen completed and WNL. Formal SLP evaluation not clinically indicated at this time. Please re-consult if further needs arise. Thanks. Zohreh Dsouza M.CD.  CCC-SLP

## 2017-04-17 NOTE — ED NOTES
Pt to MRI at this time. Pt awake and alert at time of transport. Skin warm and dry, respirations even and unlabored. Morning BG WDL.

## 2017-04-17 NOTE — DISCHARGE INSTRUCTIONS
Discharge Instructions       PATIENT ID: Aditya Copeland  MRN: [id number]   YOB: 1940    DATE OF ADMISSION: 4/16/2017  6:44 PM    DATE OF DISCHARGE: 4/17/2017    PRIMARY CARE PROVIDER: Linda Arroyo MD     ATTENDING PHYSICIAN: Mee Hughes MD  DISCHARGING PROVIDER: Mee Hughes MD    To contact this individual call [phone] and ask the  to page. If unavailable ask to be transferred the Adult Hospitalist Department. DISCHARGE DIAGNOSES TIA    CONSULTATIONS: IP CONSULT TO HOSPITALIST  IP CONSULT TO NEUROLOGY    PROCEDURES/SURGERIES: * No surgery found *    PENDING TEST RESULTS:   At the time of discharge the following test results are still pending:     FOLLOW UP APPOINTMENTS:   Follow-up Information     Follow up With Details Comments Contact Info    Linda Arroyo MD In 1 week  [address]:     DIET: Cardiac Diet    ACTIVITY: Activity as tolerated    WOUND CARE:     EQUIPMENT needed:       DISCHARGE MEDICATIONS:   See Medication Reconciliation Form    · It is important that you take the medication exactly as they are prescribed. · Keep your medication in the bottles provided by the pharmacist and keep a list of the medication names, dosages, and times to be taken in your wallet. · Do not take other medications without consulting your doctor. NOTIFY YOUR PHYSICIAN FOR ANY OF THE FOLLOWING:   Fever over 101 degrees for 24 hours. Chest pain, shortness of breath, fever, chills, nausea, vomiting, diarrhea, change in mentation, falling, weakness, bleeding. Severe pain or pain not relieved by medications. Or, any other signs or symptoms that you may have questions about.       DISPOSITION:  x  Home With:   OT  PT  HH  RN       SNF/Inpatient Rehab/LTAC    Independent/assisted living    Hospice    Other:     CDMP Checked:   Yes x     PROBLEM LIST Updated:  Yes x       Signed:   Derek Cisse MD  4/17/2017  11:59 AM

## 2017-04-17 NOTE — CONSULTS
Neuro consult completed. Full dictated note to follow. Probable TIA, has CIWM changes on MRI, no acute findings. Has risk factors of DM and hypercholesterolemia, both well controlled. Recommend starting ASA 81mg EC daily. Stable for d/c.

## 2017-04-17 NOTE — CONSULTS
[facility] [address]   [address] [facility]       Name:  Elva William   MR#:  [id number]   :  1940   Account #:  [de-identified]    Date of Consultation:  2017   Date of Adm:  2017       HISTORY OF PRESENT ILLNESS: This is a 82-year-old right-handed   gentleman who presented yesterday with complaints of brief right lower   facial numbness and right hand numbness. The patient reports he was   out working in his yard and was not doing anything strenuous when he   noticed numbness in his first, second fingers, possibly his thumb. It   was numb and tingling. He reached up and touched his face and   noticed that his lower face around his right lower lip was also numb   and tingly. It lasted for less than 30 seconds. He contacted his   daughter-in-law, whom I believe works at [employer], suggested he come to   the emergency department. The patient took three 81 mg aspirin prior   to arrival. His CT of the head was unremarkable. Potassium was 5.3. He was admitted for a TIA. Stroke workup. He has undergone MRI of   the brain, which is reviewed in PACS. He has chronic ischemic white   matter changes that are mild, but more than expected for age. His   hemoglobin A1c is 6.8. He has a history of diabetes and his diet-  controlled. His LDL is 56. He is on Lipitor 20 mg a day at home. His   echocardiogram is unremarkable. Carotid Dopplers show less than   50% stenosis in the bilateral ICAs. The patient has not had any   recurrent symptoms. He reports episode of severe vertigo 2 or 3   months ago, seen by his PCP with workup that included an echo and   carotid Dopplers at that time. He cannot recall if it was worse when he   was supine or not, but had difficulty sleeping because of it. He denies   any prior history of TIA or stroke.  He is not taking aspirin every day   because it upsets his stomach in the past when he was taking a lot of   aspirin, but has never had any GI bleeding or ulcers that he is aware   of. He does not smoke. No history of strokes in the family. PAST MEDICAL HISTORY: Diabetes, heart murmur,   thrombocytopenia undergoing workup, macular degeneration,   hypercholesterolemia, status post appendectomy, status post   tonsillectomy, status post hernia repair, status post right shoulder   surgery, status post septoplasty. REVIEW OF SYSTEMS: Per past medical history and HPI, otherwise   reviewed and negative. MEDICATIONS:   1. Prilosec. 2. Lipitor. 3. ____  4. Vitamins. ALLERGIES: NONE. SOCIAL HISTORY: He is , lives in [place]. 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 No tobacco, alcohol or drug   use. FAMILY HISTORY: Dad had dementia onset late 62s. He has 4   children who are healthy. PHYSICAL EXAMINATION   VITAL SIGNS: Blood pressure 143/81, pulse 66, respiratory rate 16,   saturating 96% on room air. Temperature is 98.4, his blood pressure at   presentation was 124/86. GENERAL: He is a well-nourished, well-developed, healthy-appearing,   in no distress. HEART: Regular rate and rhythm with 3/6 systolic ejection murmur. His   carotids are 2+, no bruits. EXTREMITIES: No edema. He has 2+ radial pulses. NEUROLOGIC: MENTAL STATUS: He is alert. He is oriented x4. Speech and language intact. Attention, memory and fund of knowledge   appropriate. CRANIAL NERVES: He has right facial asymmetry, which   he reports may be chronic. He has noticed an asymmetry between his   eyes in the past, normal strength. No ptosis. Extraocular eye   movements intact without diplopia or nystagmus. Visual fields full. Pupils equally round and reactive. Tongue is midline. Palate elevated   symmetrically. Trapezius and sternocleidomastoid are 5/5. His   sensation is intact throughout. Hearing intact to finger rub bilaterally. MOTOR EXAM: He has 5/5 strength. No pronator drift. No tremors.    Sensory exam is intact to light touch and pinprick throughout. Reflexes   are 2+ and symmetric in the upper extremities, 1+ at the knees, unable   to elicit the ankles. His toes are downgoing. His coordination was intact   to finger-to-nose, heel-to-shin, rapid alternating movements. Gait is not   assessed at this time. STUDIES AND REPORTS: Other than that mentioned above, his   platelet count 86. ASSESSMENT AND PLAN: This is a 79-year-old right-handed   gentleman presenting with very brief right face and hand numbness   concerning for TIA with risk factors of diabetes and   hypercholesterolemia with an unremarkable workup. MRI of the brain   with chronic ischemic changes, no acute strokes. Discussed his stroke   risk factors. I recommend he start taking an aspirin 81 mg enteric-  coated daily for stroke prevention, continue on home Lipitor 20 mg a   day, and the patient does not need to followup in Neurology.         MD Michael Crowell / Karoline Doshi   D:  04/17/2017   11:51   T:  04/17/2017   12:11   Job #:  [id number]

## 2021-09-04 ENCOUNTER — HOSPITAL ENCOUNTER (EMERGENCY)
Age: 81
Discharge: HOME | End: 2021-09-04
Attending: EMERGENCY MEDICINE
Payer: MEDICARE

## 2021-09-04 ENCOUNTER — APPOINTMENT (OUTPATIENT)
Dept: MRI IMAGING | Age: 81
End: 2021-09-04
Attending: EMERGENCY MEDICINE
Payer: MEDICARE

## 2021-09-04 VITALS
RESPIRATION RATE: 18 BRPM | TEMPERATURE: 98.1 F | OXYGEN SATURATION: 96 % | HEART RATE: 73 BPM | DIASTOLIC BLOOD PRESSURE: 82 MMHG | SYSTOLIC BLOOD PRESSURE: 127 MMHG

## 2021-09-04 DIAGNOSIS — R29.810 FACIAL DROOP: Primary | ICD-10-CM

## 2021-09-04 LAB
ALBUMIN SERPL-MCNC: 3.4 G/DL (ref 3.5–5)
ALBUMIN/GLOB SERPL: 0.9 {RATIO} (ref 1.1–2.2)
ALP SERPL-CCNC: 61 U/L (ref 45–117)
ALT SERPL-CCNC: 27 U/L (ref 12–78)
ANION GAP SERPL CALC-SCNC: 8 MMOL/L (ref 5–15)
AST SERPL-CCNC: ABNORMAL U/L (ref 15–37)
BASOPHILS # BLD: 0 K/UL (ref 0–0.1)
BASOPHILS NFR BLD: 0 % (ref 0–1)
BILIRUB SERPL-MCNC: 0.5 MG/DL (ref 0.2–1)
BUN SERPL-MCNC: 13 MG/DL (ref 6–20)
BUN/CREAT SERPL: 22 (ref 12–20)
CALCIUM SERPL-MCNC: 8.7 MG/DL (ref 8.5–10.1)
CHLORIDE SERPL-SCNC: 102 MMOL/L (ref 97–108)
CO2 SERPL-SCNC: 24 MMOL/L (ref 21–32)
COMMENT, HOLDF: NORMAL
CREAT SERPL-MCNC: 0.6 MG/DL (ref 0.7–1.3)
DIFFERENTIAL METHOD BLD: NORMAL
EOSINOPHIL # BLD: 0.1 K/UL (ref 0–0.4)
EOSINOPHIL NFR BLD: 1 % (ref 0–7)
ERYTHROCYTE [DISTWIDTH] IN BLOOD BY AUTOMATED COUNT: 12.9 % (ref 11.5–14.5)
GLOBULIN SER CALC-MCNC: 3.9 G/DL (ref 2–4)
GLUCOSE SERPL-MCNC: 87 MG/DL (ref 65–100)
HCT VFR BLD AUTO: 38.3 % (ref 36.6–50.3)
HGB BLD-MCNC: 13.6 G/DL (ref 12.1–17)
IMM GRANULOCYTES # BLD AUTO: 0 K/UL (ref 0–0.04)
IMM GRANULOCYTES NFR BLD AUTO: 0 % (ref 0–0.5)
LYMPHOCYTES # BLD: 1.5 K/UL (ref 0.8–3.5)
LYMPHOCYTES NFR BLD: 30 % (ref 12–49)
MCH RBC QN AUTO: 32.8 PG (ref 26–34)
MCHC RBC AUTO-ENTMCNC: 35.5 G/DL (ref 30–36.5)
MCV RBC AUTO: 92.3 FL (ref 80–99)
MONOCYTES # BLD: 0.6 K/UL (ref 0–1)
MONOCYTES NFR BLD: 11 % (ref 5–13)
NEUTS SEG # BLD: 2.8 K/UL (ref 1.8–8)
NEUTS SEG NFR BLD: 58 % (ref 32–75)
NRBC # BLD: 0 K/UL (ref 0–0.01)
NRBC BLD-RTO: 0 PER 100 WBC
PLATELET # BLD AUTO: 151 K/UL (ref 150–400)
PMV BLD AUTO: 11.4 FL (ref 8.9–12.9)
POTASSIUM SERPL-SCNC: ABNORMAL MMOL/L (ref 3.5–5.1)
PROT SERPL-MCNC: 7.3 G/DL (ref 6.4–8.2)
RBC # BLD AUTO: 4.15 M/UL (ref 4.1–5.7)
SAMPLES BEING HELD,HOLD: NORMAL
SODIUM SERPL-SCNC: 134 MMOL/L (ref 136–145)
WBC # BLD AUTO: 4.9 K/UL (ref 4.1–11.1)

## 2021-09-04 PROCEDURE — 99282 EMERGENCY DEPT VISIT SF MDM: CPT

## 2021-09-04 PROCEDURE — 70551 MRI BRAIN STEM W/O DYE: CPT

## 2021-09-04 PROCEDURE — 36415 COLL VENOUS BLD VENIPUNCTURE: CPT

## 2021-09-04 PROCEDURE — 85025 COMPLETE CBC W/AUTO DIFF WBC: CPT

## 2021-09-04 PROCEDURE — 80053 COMPREHEN METABOLIC PANEL: CPT

## 2021-09-04 NOTE — ED PROVIDER NOTES
81-year-old male with a history of DM, heart murmur, and reported prior TIA about 6 or 7 years prior presents to the emergency department stating that he has had slight right-sided facial droop for the last several years but when he take a picture of himself last night he felt like his facial droop was more significant than it had been in the past on the right side of his lower face around his lips. He states that last week lasting for about 10 minutes he felt slightly confused and off balance but then his symptoms resolved. He has not noticed any other focal numbness or weakness since. He is not sure if this facial droop is new or old but he noticed it acutely when he took a picture of his face last night. He was initially seen at urgent care facility prior to arrival where he also noted some slight discomfort in his throat and had a negative Covid and strep testing. He was then referred to the ED for further evaluation. He denies any vision changes, difficulty blinking or facial numbness or eye changes. No history of prior Bell's palsy. Past Medical History:   Diagnosis Date    Diabetes ([address]     Murmur        Past Surgical History:   Procedure Laterality Date    HX APPENDECTOMY      HX HERNIA REPAIR      HX ORTHOPAEDIC      right shoulder    HX SEPTOPLASTY      HX TONSILLECTOMY           No family history on file.     Social History     Socioeconomic History    Marital status:      Spouse name: Not on file    Number of children: Not on file    Years of education: Not on file    Highest education level: Not on file   Occupational History    Not on file   Tobacco Use    Smoking status: Never Smoker   Substance and Sexual Activity    Alcohol use: No    Drug use: Not on file    Sexual activity: Not on file   Other Topics Concern    Not on file   Social History Narrative    Not on file     Social Determinants of Health     Financial Resource Strain:     Difficulty of Paying Living Expenses:    Food Insecurity:     Worried About [address] [facility] in the Last Year:     [address] [facility] [address] in the Last Year:    Transportation Needs:     Lack of Transportation (Medical):  Lack of Transportation (Non-Medical):    Physical Activity:     Days of Exercise per Week:     Minutes of Exercise per Session:    Stress:     Feeling of Stress :    Social Connections:     Frequency of Communication with Friends and Family:     Frequency of Social Gatherings with Friends and Family:     Attends Yarsanism Services:     Active Member of Clubs or Organizations:     Attends Club or Organization Meetings:     Marital Status:    Intimate Partner Violence:     Fear of Current or Ex-Partner:     Emotionally Abused:     Physically Abused:     Sexually Abused: ALLERGIES: Patient has no known allergies. Review of Systems   Constitutional: Negative for activity change, appetite change, chills and fever. HENT: Negative for congestion, rhinorrhea, sinus pain, sneezing and sore throat. Eyes: Negative for photophobia and visual disturbance. Respiratory: Negative for cough and shortness of breath. Cardiovascular: Negative for chest pain. Gastrointestinal: Negative for abdominal pain, blood in stool, constipation, diarrhea, nausea and vomiting. Genitourinary: Negative for difficulty urinating, dysuria, flank pain, hematuria, penile pain and testicular pain. Musculoskeletal: Negative for arthralgias, back pain, myalgias and neck pain. Skin: Negative for rash and wound. Neurological: Positive for facial asymmetry. Negative for syncope, weakness, light-headedness, numbness and headaches. Psychiatric/Behavioral: Negative for self-injury and suicidal ideas. All other systems reviewed and are negative. Vitals:    09/04/21 1424   BP: 127/82   Pulse: 73   Resp: 18   Temp: 98.1 °F (36.7 °C)   SpO2: 96%            Physical Exam  Vitals and nursing note reviewed. Constitutional:       General: He is not in acute distress. Appearance: Normal appearance. He is well-developed. He is not diaphoretic. HENT:      Head: Normocephalic and atraumatic. Nose: Nose normal.   Eyes:      General: No visual field deficit. Extraocular Movements: Extraocular movements intact. Conjunctiva/sclera: Conjunctivae normal.      Pupils: Pupils are equal, round, and reactive to light. Cardiovascular:      Rate and Rhythm: Normal rate and regular rhythm. Heart sounds: Normal heart sounds. Pulmonary:      Effort: Pulmonary effort is normal.      Breath sounds: Normal breath sounds. Abdominal:      General: There is no distension. Palpations: Abdomen is soft. Tenderness: There is no abdominal tenderness. Musculoskeletal:         General: No tenderness. Cervical back: Neck supple. Skin:     General: Skin is warm and dry. Neurological:      General: No focal deficit present. Mental Status: He is alert and oriented to person, place, and time. Cranial Nerves: Facial asymmetry (Mild right-sided facial droop at right side of mouth that seems to improve with smiling with symmetric smile, no involvement of forehead, blinking equally and symmetrically with no clear evidence of Bell's palsy.) present. No cranial nerve deficit or dysarthria. Sensory: Sensation is intact. No sensory deficit. Motor: No weakness or pronator drift. Coordination: Coordination normal. Finger-Nose-Finger Test normal.      Comments: Intact sensation in bilateral face, bilateral upper and lower extremities. 5/5 strength in all 4 extremities. Fluent speech. Cranial nerves intact other than mild right facial droop. Intact finger-to-nose, negative pronator drift, normal gait. MDM   79-year-old male presents with mild right-sided facial droop, unknown chronicity but noted it after he took a picture of himself last night.   Otherwise neuro intact and in no acute distress. No involvement of the forehead or eyelid. Clinically does not appear consistent with Bell's palsy. Labs returned reassuringly showing no significant abnormalities. MRI brain was done to further evaluate and shows no acute intracranial abnormality, no acute CVA, but does show some mild chronic microvascular ischemic disease. Feel this is likely chronic. Reassurance was given. He was recommended PCP and neurology follow-up for further evaluation as needed and return precautions were given for worsening or concerns. This plan was discussed with the patient at the bedside and he stated both understanding and agreement.     Procedures

## 2021-09-04 NOTE — ED TRIAGE NOTES
Pt arrives with multiple concerns. Had episode 1 week ago of confusion that lasted approx 10 mins. Has since noticed numbness and tingling to lip. Was looking in mirror last night and noted he had right sided facial droop. Could not say last time he saw it and it was normal. \"probably last week\". Was sent in for TIA workout by urgent care. Also c/o sore throat and \"gastric discomfort\". Was seen at urgent care and tested for covid & strep. Both negative.  Sore throat x 1 week,

## 2022-10-27 ENCOUNTER — OFFICE VISIT (OUTPATIENT)
Dept: URGENT CARE | Age: 82
End: 2022-10-27
Payer: MEDICARE

## 2022-10-27 VITALS
HEART RATE: 72 BPM | DIASTOLIC BLOOD PRESSURE: 71 MMHG | SYSTOLIC BLOOD PRESSURE: 108 MMHG | OXYGEN SATURATION: 98 % | BODY MASS INDEX: 25.24 KG/M2 | TEMPERATURE: 98.6 F | WEIGHT: 166 LBS | RESPIRATION RATE: 16 BRPM

## 2022-10-27 DIAGNOSIS — M25.522 LEFT ELBOW PAIN: Primary | ICD-10-CM

## 2022-10-27 DIAGNOSIS — M79.622 LEFT UPPER ARM PAIN: ICD-10-CM
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
## 2022-10-27 PROCEDURE — G8419 CALC BMI OUT NRM PARAM NOF/U: HCPCS | Performed by: NURSE PRACTITIONER

## 2022-10-27 PROCEDURE — 99203 OFFICE O/P NEW LOW 30 MIN: CPT | Performed by: NURSE PRACTITIONER

## 2022-10-27 PROCEDURE — G8432 DEP SCR NOT DOC, RNG: HCPCS | Performed by: NURSE PRACTITIONER

## 2022-10-27 PROCEDURE — 1123F ACP DISCUSS/DSCN MKR DOCD: CPT | Performed by: NURSE PRACTITIONER

## 2022-10-27 PROCEDURE — G8427 DOCREV CUR MEDS BY ELIG CLIN: HCPCS | Performed by: NURSE PRACTITIONER

## 2022-10-27 PROCEDURE — G8536 NO DOC ELDER MAL SCRN: HCPCS | Performed by: NURSE PRACTITIONER

## 2022-10-27 PROCEDURE — 1101F PT FALLS ASSESS-DOCD LE1/YR: CPT | Performed by: NURSE PRACTITIONER

## 2022-10-27 RX ORDER — GUAIFENESIN 100 MG/5ML
81 LIQUID (ML) ORAL DAILY
COMMUNITY

## 2022-10-27 NOTE — PATIENT INSTRUCTIONS
XR Results (most recent):  Results from Appointment encounter on 10/27/22    XR HUMERUS LT    Narrative  EXAM:  XR ELBOW LT MIN 3 V, XR HUMERUS LT    INDICATION: Left arm and elbow pain after fall    COMPARISON: None. FINDINGS:  2 views left humerus. There is no acute fracture or dislocation. The joint  spaces are maintained. The soft tissues are unremarkable. 3 views left elbow. There is no acute fracture or dislocation. The joint spaces  are maintained. There is no joint effusion. Impression  No acute abnormality in the left humerus or elbow.

## 2022-10-27 NOTE — PROGRESS NOTES
Here for left arm pain  Predominantly elbow left. A little sore on forearm and upper arm near elbow. Christina Borden last night from ground level tripped over a basket that was on the floor  Hit elbow on the ground. Denies head injury, chest pain or any other pain of any body locations. Feels completely well otherwise  Hasnt tried meds. Denies weakness, numbness, tingling or swelling. Past Medical History:   Diagnosis Date    Diabetes ([address]     Murmur         Past Surgical History:   Procedure Laterality Date    HX APPENDECTOMY      HX HERNIA REPAIR      HX ORTHOPAEDIC      right shoulder    HX SEPTOPLASTY      HX TONSILLECTOMY           History reviewed. No pertinent family history. Social History     Socioeconomic History    Marital status:      Spouse name: Not on file    Number of children: Not on file    Years of education: Not on file    Highest education level: Not on file   Occupational History    Not on file   Tobacco Use    Smoking status: Never    Smokeless tobacco: Not on file   Substance and Sexual Activity    Alcohol use: No    Drug use: Not on file    Sexual activity: Not on file   Other Topics Concern    Not on file   Social History Narrative    Not on file     Social Determinants of Health     Financial Resource Strain: Not on file   Food Insecurity: Not on file   Transportation Needs: Not on file   Physical Activity: Not on file   Stress: Not on file   Social Connections: Not on file   Intimate Partner Violence: Not on file   Housing Stability: Not on file                ALLERGIES: Patient has no known allergies. Review of Systems   All other systems reviewed and are negative. Vitals:    10/27/22 1115   BP: 108/71   Pulse: 72   Resp: 16   Temp: 98.6 °F (37 °C)   SpO2: 98%   Weight: 166 lb (75.3 kg)       Physical Exam  Constitutional:       General: He is not in acute distress. Appearance: He is well-developed. HENT:      Head: Normocephalic and atraumatic. Cardiovascular:      Rate and Rhythm: Normal rate and regular rhythm. Heart sounds: Normal heart sounds. Pulmonary:      Effort: Pulmonary effort is normal.      Breath sounds: Normal breath sounds. Musculoskeletal:      Right shoulder: Normal.      Left shoulder: Normal.      Right upper arm: Normal.      Left upper arm: Normal.      Right elbow: Normal. No swelling, deformity, effusion or lacerations. Normal range of motion. No tenderness. Left elbow: No swelling, deformity or effusion. Normal range of motion. Tenderness present in olecranon process. Right wrist: Normal.      Left wrist: Normal.      Right hand: Normal.      Left hand: Normal.        Arms:       Cervical back: Normal range of motion. Comments: No scaphoid TTP wrist bilat. No chest wall TTP. Skin:     General: Skin is warm and dry. Neurological:      Mental Status: He is alert and oriented to person, place, and time. Psychiatric:         Mood and Affect: Mood normal.         Behavior: Behavior normal.         Thought Content: Thought content normal.         Judgment: Judgment normal.       MDM     Differential Diagnosis; Clinical Impression; Plan:       CLINICAL IMPRESSION:  (M25.522) Left elbow pain  (primary encounter diagnosis)  (V64.19KU) Fall from ground level  (Y10.156) Left upper arm pain    Plan:  Ground level fall with arm and elbow pain. X rays are normal today of elbow and humerous  Likely contusion  No concerning strength or neurovascular deficits today  Cool compresses  OTC tylenol as directed  Activities as tolerated              Procedures    XR Results (most recent):  Results from Appointment encounter on 10/27/22    XR HUMERUS LT    Narrative  EXAM:  XR ELBOW LT MIN 3 V, XR HUMERUS LT    INDICATION: Left arm and elbow pain after fall    COMPARISON: None. FINDINGS:  2 views left humerus. There is no acute fracture or dislocation. The joint  spaces are maintained.  The soft tissues are unremarkable. 3 views left elbow. There is no acute fracture or dislocation. The joint spaces  are maintained. There is no joint effusion. Impression  No acute abnormality in the left humerus or elbow.

## 2023-01-14 ENCOUNTER — APPOINTMENT (OUTPATIENT)
Dept: GENERAL RADIOLOGY | Age: 83
End: 2023-01-14
Attending: STUDENT IN AN ORGANIZED HEALTH CARE EDUCATION/TRAINING PROGRAM
Payer: MEDICARE

## 2023-01-14 VITALS
HEART RATE: 85 BPM | HEIGHT: 68 IN | WEIGHT: 157 LBS | OXYGEN SATURATION: 98 % | BODY MASS INDEX: 23.79 KG/M2 | DIASTOLIC BLOOD PRESSURE: 81 MMHG | SYSTOLIC BLOOD PRESSURE: 139 MMHG | RESPIRATION RATE: 18 BRPM | TEMPERATURE: 97.8 F

## 2023-01-14 LAB
BASOPHILS # BLD: 0 K/UL (ref 0–0.1)
BASOPHILS NFR BLD: 1 % (ref 0–1)
COMMENT, HOLDF: NORMAL
DIFFERENTIAL METHOD BLD: ABNORMAL
EOSINOPHIL # BLD: 0.1 K/UL (ref 0–0.4)
EOSINOPHIL NFR BLD: 1 % (ref 0–7)
ERYTHROCYTE [DISTWIDTH] IN BLOOD BY AUTOMATED COUNT: 12.7 % (ref 11.5–14.5)
HCT VFR BLD AUTO: 43.4 % (ref 36.6–50.3)
HGB BLD-MCNC: 14.1 G/DL (ref 12.1–17)
IMM GRANULOCYTES # BLD AUTO: 0 K/UL (ref 0–0.04)
IMM GRANULOCYTES NFR BLD AUTO: 1 % (ref 0–0.5)
LYMPHOCYTES # BLD: 2.2 K/UL (ref 0.8–3.5)
LYMPHOCYTES NFR BLD: 38 % (ref 12–49)
MCH RBC QN AUTO: 31.3 PG (ref 26–34)
MCHC RBC AUTO-ENTMCNC: 32.5 G/DL (ref 30–36.5)
MCV RBC AUTO: 96.2 FL (ref 80–99)
MONOCYTES # BLD: 0.6 K/UL (ref 0–1)
MONOCYTES NFR BLD: 10 % (ref 5–13)
NEUTS SEG # BLD: 2.8 K/UL (ref 1.8–8)
NEUTS SEG NFR BLD: 50 % (ref 32–75)
NRBC # BLD: 0 K/UL (ref 0–0.01)
NRBC BLD-RTO: 0 PER 100 WBC
PLATELET # BLD AUTO: 140 K/UL (ref 150–400)
PMV BLD AUTO: 10.4 FL (ref 8.9–12.9)
RBC # BLD AUTO: 4.51 M/UL (ref 4.1–5.7)
SAMPLES BEING HELD,HOLD: NORMAL
TROPONIN-HIGH SENSITIVITY: 6 NG/L (ref 0–76)
WBC # BLD AUTO: 5.7 K/UL (ref 4.1–11.1)

## 2023-01-14 PROCEDURE — 36415 COLL VENOUS BLD VENIPUNCTURE: CPT

## 2023-01-14 PROCEDURE — 85025 COMPLETE CBC W/AUTO DIFF WBC: CPT

## 2023-01-14 PROCEDURE — 99285 EMERGENCY DEPT VISIT HI MDM: CPT

## 2023-01-14 PROCEDURE — 84484 ASSAY OF TROPONIN QUANT: CPT

## 2023-01-14 PROCEDURE — 80048 BASIC METABOLIC PNL TOTAL CA: CPT

## 2023-01-14 PROCEDURE — 93005 ELECTROCARDIOGRAM TRACING: CPT

## 2023-01-14 PROCEDURE — 71046 X-RAY EXAM CHEST 2 VIEWS: CPT

## 2023-01-15 ENCOUNTER — HOSPITAL ENCOUNTER (EMERGENCY)
Age: 83
Discharge: HOME | End: 2023-01-15
Attending: STUDENT IN AN ORGANIZED HEALTH CARE EDUCATION/TRAINING PROGRAM
Payer: MEDICARE

## 2023-01-15 DIAGNOSIS — R07.9 CHEST PAIN, UNSPECIFIED TYPE: Primary | ICD-10-CM

## 2023-01-15 LAB
ANION GAP SERPL CALC-SCNC: 3 MMOL/L (ref 5–15)
ATRIAL RATE: 87 BPM
BUN SERPL-MCNC: 12 MG/DL (ref 6–20)
BUN/CREAT SERPL: 14 (ref 12–20)
CALCIUM SERPL-MCNC: 8.8 MG/DL (ref 8.5–10.1)
CALCULATED P AXIS, ECG09: 59 DEGREES
CALCULATED R AXIS, ECG10: 44 DEGREES
CALCULATED T AXIS, ECG11: 44 DEGREES
CHLORIDE SERPL-SCNC: 100 MMOL/L (ref 97–108)
CO2 SERPL-SCNC: 28 MMOL/L (ref 21–32)
CREAT SERPL-MCNC: 0.83 MG/DL (ref 0.7–1.3)
DIAGNOSIS, 93000: NORMAL
GLUCOSE SERPL-MCNC: 170 MG/DL (ref 65–100)
P-R INTERVAL, ECG05: 198 MS
POTASSIUM SERPL-SCNC: 4 MMOL/L (ref 3.5–5.1)
Q-T INTERVAL, ECG07: 402 MS
QRS DURATION, ECG06: 130 MS
QTC CALCULATION (BEZET), ECG08: 483 MS
SODIUM SERPL-SCNC: 131 MMOL/L (ref 136–145)
VENTRICULAR RATE, ECG03: 87 BPM

## 2023-01-15 NOTE — ED PROVIDER NOTES
53-year-old male with no significant past medical history presents to the ED with chief complaint of episodic chest pain over the past 4 to 5 days. Patient has had 3 total episodes that have lasted approximately 5 minutes each of sudden onset right-sided chest pain. No obvious eliciting, exacerbating, or alleviating factors. Symptoms go away on their own. No fevers, chills, cough, difficulty breathing, nausea, vomiting, abdominal pain, urinary symptoms, bowel symptoms. Reports history of chest pain in the past but denies any similar to this. The history is provided by the patient. Chest Pain (Angina)        Past Medical History:   Diagnosis Date    Diabetes ([facility] [address]     Murmur        Past Surgical History:   Procedure Laterality Date    HX APPENDECTOMY      HX HERNIA REPAIR      HX ORTHOPAEDIC      right shoulder    HX SEPTOPLASTY      HX TONSILLECTOMY           History reviewed. No pertinent family history. Social History     Socioeconomic History    Marital status:      Spouse name: Not on file    Number of children: Not on file    Years of education: Not on file    Highest education level: Not on file   Occupational History    Not on file   Tobacco Use    Smoking status: Never    Smokeless tobacco: Not on file   Substance and Sexual Activity    Alcohol use: No    Drug use: Not on file    Sexual activity: Not on file   Other Topics Concern    Not on file   Social History Narrative    Not on file     Social Determinants of Health     Financial Resource Strain: Not on file   Food Insecurity: Not on file   Transportation Needs: Not on file   Physical Activity: Not on file   Stress: Not on file   Social Connections: Not on file   Intimate Partner Violence: Not on file   Housing Stability: Not on file         ALLERGIES: Patient has no known allergies. Review of Systems   Cardiovascular:  Positive for chest pain.      Vitals:    01/14/23 2301   BP: 139/81   Pulse: 85   Resp: 18   Temp: 97.8 °F (36.6 °C)   SpO2: 98%   Weight: 71.2 kg (157 lb)   Height: 5' 8\" (1.727 m)            Physical Exam  Constitutional:       General: He is not in acute distress. Appearance: He is well-developed. HENT:      Head: Normocephalic and atraumatic. Eyes:      Conjunctiva/sclera: Conjunctivae normal.      Pupils: Pupils are equal, round, and reactive to light. Neck:      Trachea: No tracheal deviation. Cardiovascular:      Rate and Rhythm: Normal rate and regular rhythm. Heart sounds: No murmur heard. No friction rub. No gallop. Pulmonary:      Effort: Pulmonary effort is normal. No tachypnea. Abdominal:      General: There is no distension. Comments: Non-distended   Musculoskeletal:         General: No deformity. Cervical back: Neck supple. Skin:     General: Skin is warm and dry. Neurological:      Mental Status: He is alert. Motor: No weakness. Medical Decision Making  79-year-old male presenting to the ED with episodic chest pain. Vital signs stable, exam benign, EKG is nonischemic. Differential includes ACS, arrhythmia, pneumonia. Basic labs and chest x-ray without acute abnormalities. Overall low suspicion for ACS. Will discharge patient with close cardiology follow-up and strict return precautions. Amount and/or Complexity of Data Reviewed  Labs: ordered. Details: Normal troponin  Radiology: ordered and independent interpretation performed. Details: No acute findings on chest XR  ECG/medicine tests: ordered and independent interpretation performed. Decision-making details documented in ED Course. Risk  Decision regarding hospitalization.       ED Course as of 01/15/23 0028   Sat Jan 14, 2023   2303 ED EKG interpretation:11:03 PM  Rhythm: normal sinus rhythm;  Rate (approx.): 87; Axis: normal; QRS interval: prolonged; ST/T wave: normal     [JW]      ED Course User Index  [JW] Idalia Jorge MD       Procedures    DISCHARGE NOTE:  The patient has been re-evaluated and feeling much better and are stable for discharge. All available radiology and laboratory results have been reviewed with patient and/or available family. Patient and/or family verbally conveyed their understanding and agreement of the patient's signs, symptoms, diagnosis, treatment and prognosis and additionally agree to follow-up as recommended in the discharge instructions or to return to the Emergency Department should their condition change or worsen prior to their follow-up appointment. All questions have been answered and patient and/or available family express understanding. LABORATORY RESULTS:  Recent Results (from the past 24 hour(s))   EKG, 12 LEAD, INITIAL    Collection Time: 01/14/23 10:48 PM   Result Value Ref Range    Ventricular Rate 87 BPM    Atrial Rate 87 BPM    P-R Interval 198 ms    QRS Duration 130 ms    Q-T Interval 402 ms    QTC Calculation (Bezet) 483 ms    Calculated P Axis 59 degrees    Calculated R Axis 44 degrees    Calculated T Axis 44 degrees    Diagnosis       Normal sinus rhythm  Right bundle branch block  Abnormal ECG  When compared with ECG of 16-APR-2017 19:03,  Right bundle branch block is now present     [address] [facility] [address]    Collection Time: 01/14/23 11:09 PM   Result Value Ref Range    SAMPLES BEING HELD 1BLUE 1RED     COMMENT        Add-on orders for these samples will be processed based on acceptable specimen integrity and analyte stability, which may vary by analyte.    CBC WITH AUTOMATED DIFF    Collection Time: 01/14/23 11:09 PM   Result Value Ref Range    WBC 5.7 4.1 - 11.1 K/uL    RBC 4.51 4.10 - 5.70 M/uL    HGB 14.1 12.1 - 17.0 g/dL    HCT 43.4 36.6 - 50.3 %    MCV 96.2 80.0 - 99.0 FL    MCH 31.3 26.0 - 34.0 PG    MCHC 32.5 30.0 - 36.5 g/dL    RDW 12.7 11.5 - 14.5 %    PLATELET 657 (L) 801 - 400 K/uL    MPV 10.4 8.9 - 12.9 FL    NRBC 0.0 0  WBC    ABSOLUTE NRBC 0.00 0.00 - 0.01 K/uL    NEUTROPHILS 50 32 - 75 %    LYMPHOCYTES 38 12 - 49 % MONOCYTES 10 5 - 13 %    EOSINOPHILS 1 0 - 7 %    BASOPHILS 1 0 - 1 %    IMMATURE GRANULOCYTES 1 (H) 0.0 - 0.5 %    ABS. NEUTROPHILS 2.8 1.8 - 8.0 K/UL    ABS. LYMPHOCYTES 2.2 0.8 - 3.5 K/UL    ABS. MONOCYTES 0.6 0.0 - 1.0 K/UL    ABS. EOSINOPHILS 0.1 0.0 - 0.4 K/UL    ABS. BASOPHILS 0.0 0.0 - 0.1 K/UL    ABS. IMM. GRANS. 0.0 0.00 - 0.04 K/UL    DF AUTOMATED     TROPONIN-HIGH SENSITIVITY    Collection Time: 01/14/23 11:09 PM   Result Value Ref Range    Troponin-High Sensitivity 6 0 - 76 ng/L   METABOLIC PANEL, BASIC    Collection Time: 01/14/23 11:09 PM   Result Value Ref Range    Sodium 131 (L) 136 - 145 mmol/L    Potassium 4.0 3.5 - 5.1 mmol/L    Chloride 100 97 - 108 mmol/L    CO2 28 21 - 32 mmol/L    Anion gap 3 (L) 5 - 15 mmol/L    Glucose 170 (H) 65 - 100 mg/dL    BUN 12 6 - 20 MG/DL    Creatinine 0.83 0.70 - 1.30 MG/DL    BUN/Creatinine ratio 14 12 - 20      eGFR >60 >60 ml/min/1.73m2    Calcium 8.8 8.5 - 10.1 MG/DL       IMAGING RESULTS:  XR CHEST PA LAT    Result Date: 1/14/2023  Mild subpleural reticular opacity raising the possibility of mild interstitial lung disease. No focal airspace opacity or other acute process. MEDICATIONS GIVEN:  Medications - No data to display    IMPRESSION:  1.  Chest pain, unspecified type        PLAN:  Follow-up Information       Follow up With Specialties Details Why Contact Info    [facility]  In 3 days  [address], [address] [facility] [address]  [phone]    [facility] EMERGENCY DEP Emergency Medicine  As needed, If symptoms worsen [address] [facility]  [phone]          Discharge Medication List as of 1/15/2023 12:46 AM          Signed By: Denilson Kaiser MD     January 15, 2023

## 2023-01-15 NOTE — ED TRIAGE NOTES
Pt reports chest pain 4-5 days ago that subsided after about 5mins. Pt reports the pain occurred the day after and again today. Denies n/v. Pt reports some lightheadedness over the last month. Denies sob with the pain.

## 2023-01-27 ENCOUNTER — OFFICE VISIT (OUTPATIENT)
Dept: CARDIOLOGY CLINIC | Age: 83
End: 2023-01-27
Payer: MEDICARE

## 2023-01-27 VITALS
HEART RATE: 87 BPM | DIASTOLIC BLOOD PRESSURE: 60 MMHG | HEIGHT: 68 IN | OXYGEN SATURATION: 94 % | SYSTOLIC BLOOD PRESSURE: 100 MMHG | BODY MASS INDEX: 24.71 KG/M2 | RESPIRATION RATE: 16 BRPM | WEIGHT: 163 LBS

## 2023-01-27 DIAGNOSIS — I20.0 UNSTABLE ANGINA (HCC): Primary | ICD-10-CM

## 2023-01-27 DIAGNOSIS — R01.1 CARDIAC MURMUR: ICD-10-CM

## 2023-01-27 DIAGNOSIS — E78.2 MIXED HYPERLIPIDEMIA: ICD-10-CM

## 2023-01-27 DIAGNOSIS — I10 BENIGN ESSENTIAL HTN: ICD-10-CM

## 2023-01-27 PROCEDURE — G0463 HOSPITAL OUTPT CLINIC VISIT: HCPCS | Performed by: INTERNAL MEDICINE

## 2023-01-27 RX ORDER — CLONAZEPAM 0.5 MG/1
TABLET ORAL
COMMUNITY
Start: 2022-11-13

## 2023-01-27 NOTE — PATIENT INSTRUCTIONS
You will be scheduled for a Lexiscan Nuclear Stress Test after your appointment today. Nuclear stress testing evaluates blood flow to your heart muscle and assesses cardiac function. There are 2 parts (Rest/Stress) to this procedure and will include either an exercise on a treadmill or an IV administration of a stressing medication called Lexiscan. Your cardiologist will determine which type of testing is best for you. This test can be performed in one day unless it is determined that better quality images will be obtained by performing the test over two days. *Please arrive 15 minutes prior to your appointment time    Test Duration:    -One day testing will take 4 hours    Day of testing instructions:    NO CAFFEINE (not even decaffeinated products) 24 HOURS PRIOR TO TESTING. This includes coffee, soda, tea, chocolate, multivitamins, and migraine medication, like Excedrin or Fioricet that contains caffeine. Nothing to eat or drink 4 HOURS prior to testing  NO NICOTINE 12 hours prior to testing  Hold any medications requested by your cardiologist. Otherwise take medications as directed with a few sips of water. If you are unsure you may bring your medications with you to take after instructed by your stressing nurse. It is recommended that you do not hold any medications prior to your test. DIABETIC PATIENTS: Take half of your insulin with a light meal 4 hours before your test.  Wear comfortable clothes and shoes (Shirts with no metal, shorts or pants, tennis shoes, no heels or flip flops)    IMPORTANT: This testing involves a cardiac tracer ordered specifically for you. If you are unable to make your appointment, please call to cancel/reschedule AT LEAST 24 hours prior to your appointment so your tracer can be cancelled. [phone]. Our office will request echocardiogram results from PCP. Our office will call you with results.

## 2023-01-27 NOTE — PROGRESS NOTES
[place]: [facility]  [phone]    History of Present Illness:  Mr. Maddie Wayne is an [de-identified] yo M with essential hypertension, mixed hyperlipidemia, prediabetes, family history of cardiac disease, sleep apnea, gastroesophageal reflux disease, referred by Dr. Roland Burkitt for cardiac evaluation. He is here due to recent ER visit earlier this month. He presented with sudden chest pain that lasted approximately ten minutes after dinner, went across his chest.  He had never had chest discomfort like this before, so he went to the emergency room. Workup there was overall unrevealing. Since then, he has just had minor spells of this. He does note throughout his life he has had various chest discomfort that was noncardiac, either chest wall related due to reflux or sleep apnea. He went to the emergency room because it was different than those pains. He has had baseline shortness of breath also since his 25s. Sometimes it is worse when he is bending over or pulling weeds. He does admit to being a lot less active this past year. He was diagnosed with pulmonary embolism in his 80s and was on a blood thinner for a few months. He is compensated on exam with clear lungs. He does have a II-III/VI systolic murmur at the left sternal border and says he has always had a heart murmur. He does think he had an echocardiogram within the last year and we will request this. Soc hx. No tobacco  Fam hx. Brother with CAD >60  6/21/22 echo: normal EF, possible bicuspid AV, mean grad 12  Assessment and Plan:   1. Unstable angina. Chest pain and shortness of breath with typical and atypical features and multiple risk factors; will proceed with a stress test for further evaluation. He cannot fully complete a treadmill and will do this Lexiscan. We will request and review his echocardiogram   2. Cardiac murmur. Request echocardiogram as noted above. ADDENDUM:6/21/22 echo: normal EF, possible bicuspid AV, mean grad 12  3.  Sleep apnea.    4. Essential hypertension. Blood pressure is controlled. 5. Mixed hyperlipidemia. 6. History of pulmonary embolism. 7. Prediabetes. He  has a past medical history of Diabetes ([facility] [address] and Murmur. All other systems negative except as above. PE  Vitals:    01/27/23 1302   BP: 100/60   Pulse: 87   Resp: 16   SpO2: 94%   Weight: 163 lb (73.9 kg)   Height: 5' 8\" (1.727 m)    Body mass index is 24.78 kg/m².   General appearance - alert, well appearing, and in no distress  Mental status - affect appropriate to mood  Eyes - sclera anicteric, moist mucous membranes  Neck - supple, no JVD  Chest - clear to auscultation, no wheezes, rales or rhonchi  Heart - normal rate, regular rhythm, normal S1, S2, II-III/VI systolic murmur LUSB  Abdomen - soft, nontender, nondistended, no masses or organomegaly  Back exam - full range of motion, no tenderness  Neurological - no focal deficits  Extremities - peripheral pulses normal, no pedal edema      Recent Labs:  Lab Results   Component Value Date/Time    Cholesterol, total 128 04/17/2017 03:25 AM    HDL Cholesterol 57 04/17/2017 03:25 AM    LDL, calculated 56.2 04/17/2017 03:25 AM    Triglyceride 74 04/17/2017 03:25 AM    CHOL/HDL Ratio 2.2 04/17/2017 03:25 AM     Lab Results   Component Value Date/Time    Creatinine 0.83 01/14/2023 11:09 PM     Lab Results   Component Value Date/Time    BUN 12 01/14/2023 11:09 PM     Lab Results   Component Value Date/Time    Potassium 4.0 01/14/2023 11:09 PM     Lab Results   Component Value Date/Time    Hemoglobin A1c 6.8 (H) 04/17/2017 03:25 AM     Lab Results   Component Value Date/Time    HGB 14.1 01/14/2023 11:09 PM     Lab Results   Component Value Date/Time    PLATELET 213 (L) 47/81/2920 11:09 PM       Reviewed:  Past Medical History:   Diagnosis Date    Diabetes ([facility] [address]     Murmur      Social History     Tobacco Use   Smoking Status Never   Smokeless Tobacco Not on file     Social History     Substance and Sexual Activity   Alcohol Use No     No Known Allergies    Current Outpatient Medications   Medication Sig    clonazePAM (KlonoPIN) 0.5 mg tablet     LISINOPRIL PO Take 2 mg by mouth. omeprazole (PRILOSEC) 20 mg capsule Take 20 mg by mouth every other day. atorvastatin (LIPITOR) 20 mg tablet Take 20 mg by mouth daily. aspirin 81 mg chewable tablet Take 81 mg by mouth daily. (Patient not taking: Reported on 1/27/2023)    VIT A/VIT C/VIT E/ZINC/COPPER (PRESERVISION AREDS PO) Take 1 Tab by mouth two (2) times a day. (Patient not taking: Reported on 1/27/2023)     No current facility-administered medications for this visit.        Alexa Seaman MD  [facility] [place]  [facility] [address], [address] [facility] [place] [address]  [address]

## 2023-01-27 NOTE — LETTER
Patient:  Jesenia Ewing   YOB: 1940  Date of Visit: 1/27/2023    Dear Edis Maddox MD  [address] [facility] [address] [facility] [id number]  Via Fax: [phone]:    Mr. Bertha Galarza is an [de-identified] yo M with essential hypertension, mixed hyperlipidemia, prediabetes, family history of cardiac disease, sleep apnea, gastroesophageal reflux disease, referred by Dr. Richard Blankenship for cardiac evaluation. He is here due to recent ER visit earlier this month. He presented with sudden chest pain that lasted approximately ten minutes after dinner, went across his chest.  He had never had chest discomfort like this before, so he went to the emergency room. Workup there was overall unrevealing. Since then, he has just had minor spells of this. He does note throughout his life he has had various chest discomfort that was noncardiac, either chest wall related due to reflux or sleep apnea. He went to the emergency room because it was different than those pains. He has had baseline shortness of breath also since his 25s. Sometimes it is worse when he is bending over or pulling weeds. He does admit to being a lot less active this past year. He was diagnosed with pulmonary embolism in his 80s and was on a blood thinner for a few months. He is compensated on exam with clear lungs. He does have a II-III/VI systolic murmur at the left sternal border and says he has always had a heart murmur. He does think he had an echocardiogram within the last year and we will request this. Soc hx. No tobacco  Fam hx. Brother with CAD >60  6/21/22 echo: normal EF, possible bicuspid AV, mean grad 12  Assessment and Plan:   1. Unstable angina. Chest pain and shortness of breath with typical and atypical features and multiple risk factors; will proceed with a stress test for further evaluation. He cannot fully complete a treadmill and will do this Lexiscan. We will request and review his echocardiogram   2. Cardiac murmur.   Request echocardiogram as noted above. ADDENDUM:6/21/22 echo: normal EF, possible bicuspid AV, mean grad 12  3. Sleep apnea. 4. Essential hypertension. Blood pressure is controlled. 5. Mixed hyperlipidemia. 6. History of pulmonary embolism. 7. Prediabetes. If you have questions, please do not hesitate to call me.       Sincerely,      Luis Fernando Barton MD

## 2023-02-15 ENCOUNTER — ANCILLARY PROCEDURE (OUTPATIENT)
Dept: CARDIOLOGY CLINIC | Age: 83
End: 2023-02-15
Payer: MEDICARE

## 2023-02-15 VITALS
DIASTOLIC BLOOD PRESSURE: 60 MMHG | BODY MASS INDEX: 24.71 KG/M2 | SYSTOLIC BLOOD PRESSURE: 122 MMHG | HEIGHT: 68 IN | WEIGHT: 163 LBS

## 2023-02-15 DIAGNOSIS — R01.1 CARDIAC MURMUR: ICD-10-CM

## 2023-02-15 DIAGNOSIS — E78.2 MIXED HYPERLIPIDEMIA: ICD-10-CM

## 2023-02-15 DIAGNOSIS — I10 BENIGN ESSENTIAL HTN: ICD-10-CM

## 2023-02-15 DIAGNOSIS — I20.0 UNSTABLE ANGINA (HCC): ICD-10-CM

## 2023-02-15 LAB
NUC STRESS EJECTION FRACTION: 76 %
STRESS BASELINE DIAS BP: 66 MMHG
STRESS BASELINE HR: 69 BPM
STRESS BASELINE ST DEPRESSION: 0 MM
STRESS BASELINE SYS BP: 122 MMHG
STRESS O2 SAT PEAK: 100 %
STRESS O2 SAT REST: 100 %
STRESS PEAK DIAS BP: 56 MMHG
STRESS PEAK SYS BP: 90 MMHG
STRESS PERCENT HR ACHIEVED: 67 %
STRESS POST PEAK HR: 94 BPM
STRESS RATE PRESSURE PRODUCT: 8460 BPM*MMHG
STRESS ST DEPRESSION: 0 MM
STRESS TARGET HR: 140 BPM
TID: 1.09

## 2023-02-15 PROCEDURE — 93018 CV STRESS TEST I&R ONLY: CPT | Performed by: INTERNAL MEDICINE

## 2023-02-15 PROCEDURE — 93017 CV STRESS TEST TRACING ONLY: CPT | Performed by: INTERNAL MEDICINE

## 2023-02-15 PROCEDURE — 78452 HT MUSCLE IMAGE SPECT MULT: CPT | Performed by: INTERNAL MEDICINE

## 2023-02-15 PROCEDURE — A9500 TC99M SESTAMIBI: HCPCS | Performed by: INTERNAL MEDICINE

## 2023-02-15 PROCEDURE — 93016 CV STRESS TEST SUPVJ ONLY: CPT | Performed by: INTERNAL MEDICINE

## 2023-02-15 RX ORDER — TETRAKIS(2-METHOXYISOBUTYLISOCYANIDE)COPPER(I) TETRAFLUOROBORATE 1 MG/ML
10 INJECTION, POWDER, LYOPHILIZED, FOR SOLUTION INTRAVENOUS ONCE
Status: COMPLETED | OUTPATIENT
Start: 2023-02-15 | End: 2023-02-15

## 2023-02-15 RX ORDER — TETRAKIS(2-METHOXYISOBUTYLISOCYANIDE)COPPER(I) TETRAFLUOROBORATE 1 MG/ML
30 INJECTION, POWDER, LYOPHILIZED, FOR SOLUTION INTRAVENOUS ONCE
Status: COMPLETED | OUTPATIENT
Start: 2023-02-15 | End: 2023-02-15

## 2023-02-15 RX ADMIN — REGADENOSON 0.4 MG: 0.08 INJECTION, SOLUTION INTRAVENOUS at 14:05

## 2023-02-15 RX ADMIN — TECHNETIUM TC 99M SESTAMIBI 25.4 MILLICURIE: 1 INJECTION, POWDER, FOR SOLUTION INTRAVENOUS at 14:05

## 2023-02-15 RX ADMIN — TECHNETIUM TC 99M SESTAMIBI 8.5 MILLICURIE: 1 INJECTION, POWDER, FOR SOLUTION INTRAVENOUS at 13:10

## 2023-02-16 ENCOUNTER — TELEPHONE (OUTPATIENT)
Dept: CARDIOLOGY CLINIC | Age: 83
End: 2023-02-16

## 2023-02-16 NOTE — TELEPHONE ENCOUNTER
----- Message from Santino Boyle MD sent at 2/15/2023  5:21 PM EST -----  Please let pt know stress test was normal. thx

## 2023-02-16 NOTE — TELEPHONE ENCOUNTER
Attempted to reach patient by telephone. A message was left for return call. [employer] message sent.

## 2023-09-18 ENCOUNTER — OFFICE VISIT (OUTPATIENT)
Age: 83
End: 2023-09-18

## 2023-09-18 VITALS
WEIGHT: 165.1 LBS | RESPIRATION RATE: 16 BRPM | SYSTOLIC BLOOD PRESSURE: 125 MMHG | HEART RATE: 61 BPM | OXYGEN SATURATION: 96 % | TEMPERATURE: 98.5 F | DIASTOLIC BLOOD PRESSURE: 61 MMHG | BODY MASS INDEX: 25.1 KG/M2

## 2023-09-18 DIAGNOSIS — M19.042 ARTHRITIS OF FINGER OF LEFT HAND: ICD-10-CM

## 2023-09-18 DIAGNOSIS — M79.645 PAIN OF FINGER OF LEFT HAND: Primary | ICD-10-CM

## 2023-12-01 ENCOUNTER — OFFICE VISIT (OUTPATIENT)
Age: 83
End: 2023-12-01

## 2023-12-01 VITALS
DIASTOLIC BLOOD PRESSURE: 75 MMHG | BODY MASS INDEX: 24.94 KG/M2 | SYSTOLIC BLOOD PRESSURE: 116 MMHG | HEART RATE: 62 BPM | TEMPERATURE: 98.4 F | WEIGHT: 164 LBS | OXYGEN SATURATION: 98 % | RESPIRATION RATE: 16 BRPM

## 2023-12-01 DIAGNOSIS — S20.211A RIB CONTUSION, RIGHT, INITIAL ENCOUNTER: Primary | ICD-10-CM
